# Patient Record
Sex: FEMALE | Race: BLACK OR AFRICAN AMERICAN | Employment: FULL TIME | URBAN - METROPOLITAN AREA
[De-identification: names, ages, dates, MRNs, and addresses within clinical notes are randomized per-mention and may not be internally consistent; named-entity substitution may affect disease eponyms.]

---

## 2018-08-31 ENCOUNTER — HOSPITAL ENCOUNTER (EMERGENCY)
Facility: HOSPITAL | Age: 63
Discharge: HOME | End: 2018-09-01
Attending: EMERGENCY MEDICINE
Payer: COMMERCIAL

## 2018-08-31 DIAGNOSIS — M79.672 FOOT PAIN, LEFT: Primary | ICD-10-CM

## 2018-08-31 PROCEDURE — 99283 EMERGENCY DEPT VISIT LOW MDM: CPT

## 2018-09-01 ENCOUNTER — APPOINTMENT (OUTPATIENT)
Dept: GENERAL RADIOLOGY | Facility: HOSPITAL | Age: 63
End: 2018-09-01
Attending: EMERGENCY MEDICINE
Payer: COMMERCIAL

## 2018-09-01 VITALS
HEIGHT: 66 IN | HEART RATE: 76 BPM | WEIGHT: 183 LBS | BODY MASS INDEX: 29.41 KG/M2 | SYSTOLIC BLOOD PRESSURE: 127 MMHG | DIASTOLIC BLOOD PRESSURE: 68 MMHG | OXYGEN SATURATION: 100 % | RESPIRATION RATE: 17 BRPM | TEMPERATURE: 98 F

## 2018-09-01 PROCEDURE — 73630 X-RAY EXAM OF FOOT: CPT | Performed by: EMERGENCY MEDICINE

## 2018-09-01 NOTE — ED PROVIDER NOTES
Patient Seen in: [facility] AND [facility] Emergency Department    History   Patient presents with:  Swelling Edema (cardiovascular, metabolic)    Stated Complaint: pain    HPI    61 yo female with left foot pain and swelling for the last week.  She cannot recall note and vitals reviewed. ED Course   Labs Reviewed - No data to display    ED Course as of Sep 01 0114  ------------------------------------------------------------      MDM           Xray read by vision. No fracture. Discharge home on nsaids.  I

## 2021-09-24 ENCOUNTER — HOSPITAL ENCOUNTER (OUTPATIENT)
Dept: BONE DENSITY | Facility: HOSPITAL | Age: 66
Discharge: HOME | End: 2021-09-24
Attending: FAMILY MEDICINE
Payer: COMMERCIAL

## 2021-09-24 DIAGNOSIS — Z13.820 ENCOUNTER FOR SCREENING FOR OSTEOPOROSIS: ICD-10-CM

## 2021-09-24 PROCEDURE — 77080 DXA BONE DENSITY AXIAL: CPT | Performed by: FAMILY MEDICINE

## 2021-09-25 ENCOUNTER — HOSPITAL ENCOUNTER (OUTPATIENT)
Dept: MAMMOGRAPHY | Facility: HOSPITAL | Age: 66
Discharge: HOME | End: 2021-09-25
Attending: FAMILY MEDICINE
Payer: COMMERCIAL

## 2021-09-25 DIAGNOSIS — Z12.31 ENCOUNTER FOR SCREENING MAMMOGRAM FOR BREAST CANCER: ICD-10-CM

## 2021-09-25 PROCEDURE — 77067 SCR MAMMO BI INCL CAD: CPT | Performed by: FAMILY MEDICINE

## 2021-09-25 PROCEDURE — 77063 BREAST TOMOSYNTHESIS BI: CPT | Performed by: FAMILY MEDICINE

## 2022-05-28 ENCOUNTER — HOSPITAL ENCOUNTER (EMERGENCY)
Facility: HOSPITAL | Age: 67
Discharge: HOME | End: 2022-05-29
Attending: EMERGENCY MEDICINE
Payer: COMMERCIAL

## 2022-05-28 DIAGNOSIS — S39.011A ABDOMINAL MUSCLE STRAIN, INITIAL ENCOUNTER: Primary | ICD-10-CM

## 2022-05-28 PROCEDURE — 99284 EMERGENCY DEPT VISIT MOD MDM: CPT

## 2022-05-29 ENCOUNTER — APPOINTMENT (OUTPATIENT)
Dept: CT IMAGING | Facility: HOSPITAL | Age: 67
End: 2022-05-29
Attending: EMERGENCY MEDICINE
Payer: COMMERCIAL

## 2022-05-29 VITALS
OXYGEN SATURATION: 98 % | DIASTOLIC BLOOD PRESSURE: 83 MMHG | TEMPERATURE: 98 F | SYSTOLIC BLOOD PRESSURE: 142 MMHG | HEIGHT: 66 IN | BODY MASS INDEX: 29.25 KG/M2 | WEIGHT: 182 LBS | HEART RATE: 70 BPM | RESPIRATION RATE: 16 BRPM

## 2022-05-29 PROCEDURE — 74176 CT ABD & PELVIS W/O CONTRAST: CPT | Performed by: EMERGENCY MEDICINE

## 2022-05-29 NOTE — ED INITIAL ASSESSMENT (HPI)
Right flank pain radiating into RLQ since Wednesday. Denies fevers, N/V/D, difficulty with urination.

## 2022-09-26 ENCOUNTER — HOSPITAL ENCOUNTER (OUTPATIENT)
Dept: MAMMOGRAPHY | Facility: HOSPITAL | Age: 67
Discharge: HOME | End: 2022-09-26
Attending: FAMILY MEDICINE

## 2022-09-26 DIAGNOSIS — Z12.31 ENCOUNTER FOR SCREENING MAMMOGRAM FOR MALIGNANT NEOPLASM OF BREAST: ICD-10-CM

## 2022-09-26 PROCEDURE — 77067 SCR MAMMO BI INCL CAD: CPT | Performed by: FAMILY MEDICINE

## 2022-09-26 PROCEDURE — 77063 BREAST TOMOSYNTHESIS BI: CPT | Performed by: FAMILY MEDICINE

## 2023-03-28 NOTE — ED QUICK NOTES
Pt a/ox4, respirations unlabored, speech full/clear, gait steady, no acute distress. All ED orders completed. Pt instructed to return to ED for any new/severe s/s or as directed by provider, and to see PMD/specialist ASAP or as directed by provider. Detail Level: Detailed Detail Level: Zone

## 2023-10-03 ENCOUNTER — HOSPITAL ENCOUNTER (OUTPATIENT)
Dept: MAMMOGRAPHY | Facility: HOSPITAL | Age: 68
Discharge: HOME | End: 2023-10-03
Attending: FAMILY MEDICINE
Payer: COMMERCIAL

## 2023-10-03 DIAGNOSIS — Z12.31 ENCOUNTER FOR SCREENING MAMMOGRAM FOR MALIGNANT NEOPLASM OF BREAST: ICD-10-CM

## 2023-10-03 PROCEDURE — 77063 BREAST TOMOSYNTHESIS BI: CPT | Performed by: FAMILY MEDICINE

## 2023-10-03 PROCEDURE — 77067 SCR MAMMO BI INCL CAD: CPT | Performed by: FAMILY MEDICINE

## 2023-12-28 ENCOUNTER — APPOINTMENT (OUTPATIENT)
Dept: GENERAL RADIOLOGY | Facility: HOSPITAL | Age: 68
End: 2023-12-28
Attending: PHYSICIAN ASSISTANT
Payer: COMMERCIAL

## 2023-12-28 ENCOUNTER — APPOINTMENT (OUTPATIENT)
Dept: CT IMAGING | Facility: HOSPITAL | Age: 68
End: 2023-12-28
Attending: EMERGENCY MEDICINE
Payer: COMMERCIAL

## 2023-12-28 ENCOUNTER — APPOINTMENT (OUTPATIENT)
Dept: GENERAL RADIOLOGY | Facility: HOSPITAL | Age: 68
End: 2023-12-28
Attending: INTERNAL MEDICINE
Payer: COMMERCIAL

## 2023-12-28 ENCOUNTER — HOSPITAL ENCOUNTER (INPATIENT)
Facility: HOSPITAL | Age: 68
LOS: 5 days | Discharge: HOME HEALTH | End: 2024-01-02
Attending: EMERGENCY MEDICINE
Payer: COMMERCIAL

## 2023-12-28 ENCOUNTER — APPOINTMENT (OUTPATIENT)
Dept: GENERAL RADIOLOGY | Facility: HOSPITAL | Age: 68
End: 2023-12-28
Attending: EMERGENCY MEDICINE
Payer: COMMERCIAL

## 2023-12-28 ENCOUNTER — HOSPITAL ENCOUNTER (INPATIENT)
Facility: HOSPITAL | Age: 68
LOS: 5 days | Discharge: HOME HEALTH | End: 2024-01-02
Attending: EMERGENCY MEDICINE | Admitting: INTERNAL MEDICINE
Payer: COMMERCIAL

## 2023-12-28 DIAGNOSIS — S06.0X1A CONCUSSION WITH LOSS OF CONSCIOUSNESS OF 30 MINUTES OR LESS, INITIAL ENCOUNTER: ICD-10-CM

## 2023-12-28 DIAGNOSIS — S60.811A ABRASION OF RIGHT WRIST, INITIAL ENCOUNTER: ICD-10-CM
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
## 2023-12-28 DIAGNOSIS — S00.03XA HEMATOMA OF SCALP, INITIAL ENCOUNTER: ICD-10-CM

## 2023-12-28 PROBLEM — S06.0XAA CONCUSSION: Status: ACTIVE | Noted: 2023-12-28

## 2023-12-28 LAB
ANION GAP SERPL CALC-SCNC: 5 MMOL/L (ref 0–18)
BASOPHILS # BLD AUTO: 0.02 X10(3) UL (ref 0–0.2)
BASOPHILS NFR BLD AUTO: 0.4 %
BUN BLD-MCNC: 21 MG/DL (ref 9–23)
BUN/CREAT SERPL: 23.3 (ref 10–20)
CALCIUM BLD-MCNC: 8.6 MG/DL (ref 8.7–10.4)
CHLORIDE SERPL-SCNC: 107 MMOL/L (ref 98–112)
CO2 SERPL-SCNC: 28 MMOL/L (ref 21–32)
CREAT BLD-MCNC: 0.9 MG/DL
DEPRECATED RDW RBC AUTO: 44.4 FL (ref 35.1–46.3)
EGFRCR SERPLBLD CKD-EPI 2021: 69 ML/MIN/1.73M2 (ref 60–?)
EOSINOPHIL # BLD AUTO: 0.18 X10(3) UL (ref 0–0.7)
EOSINOPHIL NFR BLD AUTO: 3.5 %
ERYTHROCYTE [DISTWIDTH] IN BLOOD BY AUTOMATED COUNT: 13.8 % (ref 11–15)
GLUCOSE BLD-MCNC: 141 MG/DL (ref 70–99)
GLUCOSE BLDC GLUCOMTR-MCNC: 128 MG/DL (ref 70–99)
HCT VFR BLD AUTO: 36.7 %
HGB BLD-MCNC: 11.3 G/DL
IMM GRANULOCYTES # BLD AUTO: 0.01 X10(3) UL (ref 0–1)
IMM GRANULOCYTES NFR BLD: 0.2 %
LYMPHOCYTES # BLD AUTO: 2.73 X10(3) UL (ref 1–4)
LYMPHOCYTES NFR BLD AUTO: 52.9 %
MCH RBC QN AUTO: 26.7 PG (ref 26–34)
MCHC RBC AUTO-ENTMCNC: 30.8 G/DL (ref 31–37)
MCV RBC AUTO: 86.8 FL
MONOCYTES # BLD AUTO: 0.35 X10(3) UL (ref 0.1–1)
MONOCYTES NFR BLD AUTO: 6.8 %
NEUTROPHILS # BLD AUTO: 1.87 X10 (3) UL (ref 1.5–7.7)
NEUTROPHILS # BLD AUTO: 1.87 X10(3) UL (ref 1.5–7.7)
NEUTROPHILS NFR BLD AUTO: 36.2 %
OSMOLALITY SERPL CALC.SUM OF ELEC: 295 MOSM/KG (ref 275–295)
PLATELET # BLD AUTO: 240 10(3)UL (ref 150–450)
POTASSIUM SERPL-SCNC: 4.5 MMOL/L (ref 3.5–5.1)
RBC # BLD AUTO: 4.23 X10(6)UL
SODIUM SERPL-SCNC: 140 MMOL/L (ref 136–145)
WBC # BLD AUTO: 5.2 X10(3) UL (ref 4–11)

## 2023-12-28 PROCEDURE — 99223 1ST HOSP IP/OBS HIGH 75: CPT | Performed by: INTERNAL MEDICINE

## 2023-12-28 PROCEDURE — 70450 CT HEAD/BRAIN W/O DYE: CPT | Performed by: EMERGENCY MEDICINE

## 2023-12-28 PROCEDURE — 72125 CT NECK SPINE W/O DYE: CPT | Performed by: EMERGENCY MEDICINE

## 2023-12-28 PROCEDURE — 73502 X-RAY EXAM HIP UNI 2-3 VIEWS: CPT | Performed by: PHYSICIAN ASSISTANT

## 2023-12-28 PROCEDURE — 73590 X-RAY EXAM OF LOWER LEG: CPT | Performed by: INTERNAL MEDICINE

## 2023-12-28 PROCEDURE — 72040 X-RAY EXAM NECK SPINE 2-3 VW: CPT | Performed by: INTERNAL MEDICINE

## 2023-12-28 PROCEDURE — 73110 X-RAY EXAM OF WRIST: CPT | Performed by: EMERGENCY MEDICINE

## 2023-12-28 PROCEDURE — 73501 X-RAY EXAM HIP UNI 1 VIEW: CPT | Performed by: PHYSICIAN ASSISTANT

## 2023-12-28 PROCEDURE — 99497 ADVNCD CARE PLAN 30 MIN: CPT | Performed by: INTERNAL MEDICINE

## 2023-12-28 RX ORDER — HYDROCODONE BITARTRATE AND ACETAMINOPHEN 5; 325 MG/1; MG/1
1 TABLET ORAL EVERY 4 HOURS PRN
Status: DISCONTINUED | OUTPATIENT
Start: 2023-12-28 | End: 2024-01-02

## 2023-12-28 RX ORDER — ONDANSETRON 2 MG/ML
4 INJECTION INTRAMUSCULAR; INTRAVENOUS ONCE
Status: COMPLETED | OUTPATIENT
Start: 2023-12-28 | End: 2023-12-28

## 2023-12-28 RX ORDER — MELATONIN
3 NIGHTLY PRN
Status: DISCONTINUED | OUTPATIENT
Start: 2023-12-28 | End: 2024-01-02

## 2023-12-28 RX ORDER — POLYETHYLENE GLYCOL 3350 17 G/17G
17 POWDER, FOR SOLUTION ORAL DAILY PRN
Status: DISCONTINUED | OUTPATIENT
Start: 2023-12-28 | End: 2024-01-02

## 2023-12-28 RX ORDER — ENEMA 19; 7 G/133ML; G/133ML
1 ENEMA RECTAL ONCE AS NEEDED
Status: DISCONTINUED | OUTPATIENT
Start: 2023-12-28 | End: 2024-01-02

## 2023-12-28 RX ORDER — METOCLOPRAMIDE HYDROCHLORIDE 5 MG/ML
5 INJECTION INTRAMUSCULAR; INTRAVENOUS EVERY 8 HOURS PRN
Status: DISCONTINUED | OUTPATIENT
Start: 2023-12-28 | End: 2024-01-02

## 2023-12-28 RX ORDER — MORPHINE SULFATE 2 MG/ML
1 INJECTION, SOLUTION INTRAMUSCULAR; INTRAVENOUS EVERY 2 HOUR PRN
Status: DISCONTINUED | OUTPATIENT
Start: 2023-12-28 | End: 2024-01-02

## 2023-12-28 RX ORDER — MORPHINE SULFATE 2 MG/ML
2 INJECTION, SOLUTION INTRAMUSCULAR; INTRAVENOUS EVERY 2 HOUR PRN
Status: DISCONTINUED | OUTPATIENT
Start: 2023-12-28 | End: 2024-01-02

## 2023-12-28 RX ORDER — BENZONATATE 200 MG/1
200 CAPSULE ORAL 3 TIMES DAILY PRN
Status: DISCONTINUED | OUTPATIENT
Start: 2023-12-28 | End: 2024-01-02

## 2023-12-28 RX ORDER — ACETAMINOPHEN 500 MG
500 TABLET ORAL EVERY 4 HOURS PRN
Status: DISCONTINUED | OUTPATIENT
Start: 2023-12-28 | End: 2024-01-02

## 2023-12-28 RX ORDER — HYDROCODONE BITARTRATE AND ACETAMINOPHEN 5; 325 MG/1; MG/1
2 TABLET ORAL EVERY 4 HOURS PRN
Status: DISCONTINUED | OUTPATIENT
Start: 2023-12-28 | End: 2024-01-02

## 2023-12-28 RX ORDER — MORPHINE SULFATE 4 MG/ML
4 INJECTION, SOLUTION INTRAMUSCULAR; INTRAVENOUS EVERY 2 HOUR PRN
Status: DISCONTINUED | OUTPATIENT
Start: 2023-12-28 | End: 2024-01-02

## 2023-12-28 RX ORDER — ONDANSETRON 2 MG/ML
4 INJECTION INTRAMUSCULAR; INTRAVENOUS EVERY 6 HOURS PRN
Status: DISCONTINUED | OUTPATIENT
Start: 2023-12-28 | End: 2024-01-02

## 2023-12-28 RX ORDER — SENNOSIDES 8.6 MG
17.2 TABLET ORAL NIGHTLY PRN
Status: DISCONTINUED | OUTPATIENT
Start: 2023-12-28 | End: 2024-01-02

## 2023-12-28 RX ORDER — ACETAMINOPHEN 325 MG/1
650 TABLET ORAL EVERY 4 HOURS PRN
Status: DISCONTINUED | OUTPATIENT
Start: 2023-12-28 | End: 2024-01-02

## 2023-12-28 RX ORDER — BISACODYL 10 MG
10 SUPPOSITORY, RECTAL RECTAL
Status: DISCONTINUED | OUTPATIENT
Start: 2023-12-28 | End: 2024-01-02

## 2023-12-28 NOTE — ED QUICK NOTES
Officer Richardson - [place] [employer]  [phone]  Would like an update if patient ends up getting admitted or has serious injury  Pt is otherwise free to go home if discharged

## 2023-12-28 NOTE — PROGRESS NOTES
OT orders generated from Functional Mobility Screen.Pt currently in xray. Ortho consult pending for possible distal radial fx. OT eval deferred pending xray results and completed ortho consult

## 2023-12-28 NOTE — ED PROVIDER NOTES
Patient Seen in: [facility] Emergency Department    History     Chief Complaint   Patient presents with    Fall       HPI    History is provided by patient/independent historian: Patient, patient's    68-year-old female with no significant past medical history here after a fall down 5 steps earlier today.  Patient supposedly got unbalanced after her grandchildren ran into her and she fell down 5 steps.  She did supposedly lose consciousness.  Patient herself has no complaints at this time.   reports that she is more soft-spoken than usual.    History reviewed. History reviewed. No pertinent past medical history.      History reviewed.   Past Surgical History:   Procedure Laterality Date    LETITIA LOCALIZATION WIRE 1 SITE LEFT (CPT=19281)      2019    NEEDLE BIOPSY RIGHT           Home Medications reviewed :  (Not in a hospital admission)        History reviewed.   Social History     Socioeconomic History    Marital status:    Tobacco Use    Smoking status: Never    Smokeless tobacco: Never         ROS  Review of Systems   Respiratory:  Negative for shortness of breath.    Cardiovascular:  Negative for chest pain.   Neurological:  Positive for syncope.   All other systems reviewed and are negative.     All other pertinent organ systems are reviewed and are negative.      Physical Exam     ED Triage Vitals [12/28/23 0735]   /80   Pulse 83   Resp 16   Temp 96.8 °F (36 °C)   Temp src    SpO2 98 %   O2 Device None (Room air)     Vital signs reviewed.      Physical Exam  Vitals and nursing note reviewed.   HENT:      Head:     Cardiovascular:      Pulses: Normal pulses.   Pulmonary:      Effort: No respiratory distress.   Abdominal:      General: There is no distension.   Musculoskeletal:        Arms:    Neurological:      Mental Status: She is alert.      Comments: Answering questions appropriately, no facial asymmetry, 5-5 strength in bilateral upper and lower extremities         ED  Course       Labs:     Labs Reviewed   POCT GLUCOSE - Abnormal; Notable for the following components:       Result Value    POC Glucose  128 (*)     All other components within normal limits   CBC W/ DIFFERENTIAL - Abnormal; Notable for the following components:    HGB 11.3 (*)     MCHC 30.8 (*)     All other components within normal limits   CBC WITH DIFFERENTIAL WITH PLATELET    Narrative:     The following orders were created for panel order CBC With Differential With Platelet.                  Procedure                               Abnormality         Status                                     ---------                               -----------         ------                                     CBC W/ DIFFERENTIAL[[id number]]          Abnormal            Final result                                                 Please view results for these tests on the individual orders.   BASIC METABOLIC PANEL (8)   RAINBOW DRAW LAVENDER   RAINBOW DRAW LIGHT GREEN   RAINBOW DRAW BLUE   RAINBOW DRAW GOLD         My EKG Interpretation:   As reviewed and Interpreted by me      Imaging Results Available and Reviewed while in ED:   XR WRIST COMPLETE (MIN 3 VIEWS), RIGHT (CPT=73110)    Result Date: 12/28/2023  CONCLUSION: Suspect distal radial impaction fracture .  No cortical disruption    Dictated by (CST): Cristhian Neal MD on 12/28/2023 at 9:15 AM     Finalized by (CST): Cristihan Neal MD on 12/28/2023 at 9:16 AM          CT SPINE CERVICAL (CPT=72125)    Result Date: 12/28/2023  CONCLUSION:  1. No acute fracture or posttraumatic malalignment cervical spine is detected.  2. Degenerative changes of the cervical spine, particularly at C5-C6.  3. Lesser incidental findings as above.    Dictated by (CST): Bryan Christianson MD on 12/28/2023 at 8:26 AM     Finalized by (CST): Bryan Christianson MD on 12/28/2023 at 8:29 AM          CT BRAIN OR HEAD ([id number])    Result Date: 12/28/2023  CONCLUSION:  1. Large right parietal subgaleal hematoma  without evidence of underlying calvarial fracture, coup/contrecoup intraparenchymal contusion, or intracranial hemorrhage.  2. Senescent changes of parenchymal volume loss with sequela of chronic microvascular ischemic disease. There is also large vessel atherosclerosis.   3. Extensive paranasal sinus disease, likely chronic.  4. Lesser incidental findings as above.    Dictated by (CST): Bryan Christianson MD on 12/28/2023 at 8:22 AM     Finalized by (CST): Bryan Christianson MD on 12/28/2023 at 8:25 AM         My review and independent interpretation of CT images: no ICH. Radiology report corroborates this in addition to other details as reported by them.      Decision rules/scores evaluated: none      Diagnostic labs/tests considered but not ordered: CBC, BMP, type and screen    ED Medications Administered:   Medications   ondansetron (Zofran) 4 MG/2ML injection 4 mg (4 mg Intravenous Given 12/28/23 0856)            - pt reevaluated - still drowsy, cannot recall earlier events    MDM       Medical Decision Making      Differential Diagnosis: After obtaining the patient's history, performing the physical exam and reviewing the diagnostics, multiple initial diagnoses were considered based on the presenting problem including fall, fracture, contusion, ICH    External document review: I personally reviewed available external medical records for any recent pertinent discharge summaries, testing, and procedures - the findings are as follows: 8/4/23 visit with Dr. Peterson for colonoscopy    Complicating Factors: The patient already  has no past medical history on file. to contribute to the complexity of this ED evaluation.    Procedures performed: none    Discussed management with physician/appropriate source: Dr. Bro    Considered admission/deescalation of care for: fall    Social determinants of health affecting patient care: none    Prescription medications considered: discussed continuing current medication  regimen    The patient requires continuous monitoring for: fall    Shared decision making: discussed possible admission      Disposition and Plan     Clinical Impression:  1. Fall, initial encounter    2. Abrasion of right wrist, initial encounter    3. Concussion with loss of consciousness of 30 minutes or less, initial encounter    4. Hematoma of scalp, initial encounter        Disposition:  Admit    Follow-up:  No follow-up provider specified.    Medications Prescribed:  There are no discharge medications for this patient.      Hospital Problems       Present on Admission             ICD-10-CM Noted POA    Concussion S06.0XAA 12/28/2023 Unknown

## 2023-12-28 NOTE — ED INITIAL ASSESSMENT (HPI)
Pt presents to ED via EMS for mechanical fall down approximately 4 stairs.  witnessed the fall, states grandchildren bumped into her and she fell backwards, hitting her head and losing consciousness. Upon arrival to ED pt is responding to Dr Wright by nodding her head \"yes\" and \"no\" but mostly nonverbal at this time. Scrape to right wrist visible, no deformity. Pt in c collar on arrival

## 2023-12-28 NOTE — PROGRESS NOTES
[facility]    Progress Note    Gita Bustamante Patient Status:  Inpatient    10/29/1955 MRN [id number]   Location [facility] 5SW/SE Attending Juan Tejeda MD   Hosp Day # 0 PCP SUKHWINDER THOMPSON MD     SUBJECTIVE:    Pt's  confirmed a fall down 4-5 stairs this AM.  Pt answering questions appropriately with eyes closed.  States pain to ulnar aspect right wrist/distal forearm.  Starting to have right hip pain.    OBJECTIVE:  Pt 67 yo female admitted through ER for head injury sustained the AM after a fall down 4-5 stairs. Ortho consulted for right wrist injury evaluation, x-rays read as suspicious for distal radial fracture.      Blood pressure 139/72, pulse 70, temperature 97.5 °F (36.4 °C), temperature source Axillary, resp. rate 16, height 5' 6\" (1.676 m), weight 164 lb 1.6 oz (74.4 kg), SpO2 100%.         Recent Results (from the past 24 hour(s))   POCT Glucose    Collection Time: 23  7:33 AM   Result Value Ref Range    POC Glucose  128 (H) 70 - 99 mg/dL   RAINBOW DRAW LAVENDER    Collection Time: 23  7:37 AM   Result Value Ref Range    Hold Lavender Auto Resulted    RAINBOW DRAW LIGHT GREEN    Collection Time: 23  7:37 AM   Result Value Ref Range    Hold Lt Green Auto Resulted    RAINBOW DRAW BLUE    Collection Time: 23  7:37 AM   Result Value Ref Range    Hold Blue Auto Resulted    RAINBOW DRAW GOLD    Collection Time: 23  7:37 AM   Result Value Ref Range    Hold Gold Auto Resulted    Basic Metabolic Panel (8)    Collection Time: 23  7:37 AM   Result Value Ref Range    Glucose 141 (H) 70 - 99 mg/dL    Sodium 140 136 - 145 mmol/L    Potassium 4.5 3.5 - 5.1 mmol/L    Chloride 107 98 - 112 mmol/L    CO2 28.0 21.0 - 32.0 mmol/L    Anion Gap 5 0 - 18 mmol/L    BUN 21 9 - 23 mg/dL    Creatinine 0.90 0.55 - 1.02 mg/dL    BUN/CREA Ratio 23.3 (H) 10.0 - 20.0    Calcium, Total 8.6 (L) 8.7 - 10.4 mg/dL    Calculated Osmolality 295 275 - 295 mOsm/kg     eGFR-Cr 69 >=60 mL/min/1.73m2   CBC W/ DIFFERENTIAL    Collection Time: 12/28/23  7:37 AM   Result Value Ref Range    WBC 5.2 4.0 - 11.0 x10(3) uL    RBC 4.23 3.80 - 5.30 x10(6)uL    HGB 11.3 (L) 12.0 - 16.0 g/dL    HCT 36.7 35.0 - 48.0 %    MCV 86.8 80.0 - 100.0 fL    MCH 26.7 26.0 - 34.0 pg    MCHC 30.8 (L) 31.0 - 37.0 g/dL    RDW-SD 44.4 35.1 - 46.3 fL    RDW 13.8 11.0 - 15.0 %    .0 150.0 - 450.0 10(3)uL    Neutrophil Absolute Prelim 1.87 1.50 - 7.70 x10 (3) uL    Neutrophil Absolute 1.87 1.50 - 7.70 x10(3) uL    Lymphocyte Absolute 2.73 1.00 - 4.00 x10(3) uL    Monocyte Absolute 0.35 0.10 - 1.00 x10(3) uL    Eosinophil Absolute 0.18 0.00 - 0.70 x10(3) uL    Basophil Absolute 0.02 0.00 - 0.20 x10(3) uL    Immature Granulocyte Absolute 0.01 0.00 - 1.00 x10(3) uL    Neutrophil % 36.2 %    Lymphocyte % 52.9 %    Monocyte % 6.8 %    Eosinophil % 3.5 %    Basophil % 0.4 %    Immature Granulocyte % 0.2 %        XR WRIST COMPLETE (MIN 3 VIEWS), RIGHT (CPT=73110)    Result Date: 12/28/2023  CONCLUSION: Suspect distal radial impaction fracture .  No cortical disruption    Dictated by (CST): Cristhian Neal MD on 12/28/2023 at 9:15 AM     Finalized by (CST): Cristhian Neal MD on 12/28/2023 at 9:16 AM          CT SPINE CERVICAL (CPT=72125)    Result Date: 12/28/2023  CONCLUSION:  1. No acute fracture or posttraumatic malalignment cervical spine is detected.  2. Degenerative changes of the cervical spine, particularly at C5-C6.  3. Lesser incidental findings as above.    Dictated by (CST): rByan Christianson MD on 12/28/2023 at 8:26 AM     Finalized by (CST): Bryan Christianson MD on 12/28/2023 at 8:29 AM          CT BRAIN OR HEAD ([id number])    Result Date: 12/28/2023  CONCLUSION:  1. Large right parietal subgaleal hematoma without evidence of underlying calvarial fracture, coup/contrecoup intraparenchymal contusion, or intracranial hemorrhage.  2. Senescent changes of parenchymal volume loss with sequela of chronic  microvascular ischemic disease. There is also large vessel atherosclerosis.   3. Extensive paranasal sinus disease, likely chronic.  4. Lesser incidental findings as above.    Dictated by (CST): Bryan Christianson MD on 12/28/2023 at 8:22 AM     Finalized by (CST): Bryan Christianson MD on 12/28/2023 at 8:25 AM              Ortho Exam:    Right distal forearm dorsal ulnar sided superficial abrasion with surrounding ecchymosis and mild swelling. Tender distal ulna, non-tender distal radius, hand and phalanges, humerus and shoulder.  Full ROM all fingers with mild swelling. Full elbow flexion/extension, sup/pronation. NVI.    Right LE no rotational deformity noted. Lifts heel off bed, flexes knee, +DF/PF. No groin pain with log roll. No pain to pressure over femur but + pain lateral greater trochanter. NVI.    X-rays right wrist reviewed by Dr. Garcia, no apparent acute fracture. Can not r/o occult fracture.     ASSESSMENT/PLAN:    Right wrist contusion, possible occult fracture  Right hip pain    Velcro splint to right wrist. No heavy lifting/pushing/pulling. May remove for hygiene purposes otherwise on at all times.  2.   Right hip pain, per RN,  tib/fib x-rays have been ordered by hospitalist. Will order right hip x-rays.  3.   Follow -up in office in 3 weeks for repeat x-rays right wrist and evaluation with Dr. Garcia. Call [phone] for appointment.      Virginia Novak PA-C/Dr. Garcia  12/28/2023  1:43 PM

## 2023-12-28 NOTE — ED QUICK NOTES
Pt was asked in privacy if she feels safe at home or if anyone is threatening her safety.  Pt denies feeling threatened or unsafe at home

## 2023-12-28 NOTE — ED QUICK NOTES
Orders for admission, patient is aware of plan and ready to go upstairs. Any questions, please call ED RN nehemiah at extension [phone].     Patient Covid vaccination status: Unvaccinated     COVID Test Ordered in ED: None    COVID Suspicion at Admission: N/A    Running Infusions:  None    Mental Status/LOC at time of transport: AAOx4 but slow to respond     Other pertinent information: pt has wound to back of scalp which has been irrigated and assessed by dr pratt  Pt is stable in ED, not ambulatory and NPO in ED d/t nausea  CIWA score: N/A   NIH score:  N/A

## 2023-12-29 LAB
ANION GAP SERPL CALC-SCNC: 2 MMOL/L (ref 0–18)
BASOPHILS # BLD AUTO: 0.01 X10(3) UL (ref 0–0.2)
BASOPHILS NFR BLD AUTO: 0.2 %
BUN BLD-MCNC: 12 MG/DL (ref 9–23)
BUN/CREAT SERPL: 16 (ref 10–20)
CALCIUM BLD-MCNC: 8.8 MG/DL (ref 8.7–10.4)
CHLORIDE SERPL-SCNC: 104 MMOL/L (ref 98–112)
CO2 SERPL-SCNC: 29 MMOL/L (ref 21–32)
CREAT BLD-MCNC: 0.75 MG/DL
DEPRECATED RDW RBC AUTO: 42.8 FL (ref 35.1–46.3)
EGFRCR SERPLBLD CKD-EPI 2021: 86 ML/MIN/1.73M2 (ref 60–?)
EOSINOPHIL # BLD AUTO: 0.03 X10(3) UL (ref 0–0.7)
EOSINOPHIL NFR BLD AUTO: 0.6 %
ERYTHROCYTE [DISTWIDTH] IN BLOOD BY AUTOMATED COUNT: 13.7 % (ref 11–15)
GLUCOSE BLD-MCNC: 111 MG/DL (ref 70–99)
HCT VFR BLD AUTO: 34.8 %
HGB BLD-MCNC: 10.9 G/DL
IMM GRANULOCYTES # BLD AUTO: 0.01 X10(3) UL (ref 0–1)
IMM GRANULOCYTES NFR BLD: 0.2 %
LYMPHOCYTES # BLD AUTO: 0.95 X10(3) UL (ref 1–4)
LYMPHOCYTES NFR BLD AUTO: 18.5 %
MAGNESIUM SERPL-MCNC: 1.8 MG/DL (ref 1.6–2.6)
MCH RBC QN AUTO: 26.7 PG (ref 26–34)
MCHC RBC AUTO-ENTMCNC: 31.3 G/DL (ref 31–37)
MCV RBC AUTO: 85.1 FL
MONOCYTES # BLD AUTO: 0.32 X10(3) UL (ref 0.1–1)
MONOCYTES NFR BLD AUTO: 6.2 %
NEUTROPHILS # BLD AUTO: 3.82 X10 (3) UL (ref 1.5–7.7)
NEUTROPHILS # BLD AUTO: 3.82 X10(3) UL (ref 1.5–7.7)
NEUTROPHILS NFR BLD AUTO: 74.3 %
OSMOLALITY SERPL CALC.SUM OF ELEC: 280 MOSM/KG (ref 275–295)
PLATELET # BLD AUTO: 233 10(3)UL (ref 150–450)
POTASSIUM SERPL-SCNC: 3.6 MMOL/L (ref 3.5–5.1)
RBC # BLD AUTO: 4.09 X10(6)UL
SODIUM SERPL-SCNC: 135 MMOL/L (ref 136–145)
WBC # BLD AUTO: 5.1 X10(3) UL (ref 4–11)

## 2023-12-29 PROCEDURE — 99233 SBSQ HOSP IP/OBS HIGH 50: CPT | Performed by: INTERNAL MEDICINE

## 2023-12-29 RX ORDER — SODIUM CHLORIDE 9 MG/ML
INJECTION, SOLUTION INTRAVENOUS CONTINUOUS
Status: DISCONTINUED | OUTPATIENT
Start: 2023-12-29 | End: 2024-01-02

## 2023-12-29 RX ORDER — MAGNESIUM OXIDE 400 MG/1
400 TABLET ORAL ONCE
Status: COMPLETED | OUTPATIENT
Start: 2023-12-29 | End: 2023-12-29

## 2023-12-29 NOTE — PHYSICAL THERAPY NOTE
PHYSICAL THERAPY EVALUATION - INPATIENT     Room Number: 553/553-A  Evaluation Date: 12/29/2023  Type of Evaluation: Initial   Physician Order: PT Eval and Treat    Presenting Problem: fall down stairs, R distal radial fx (NWB; wrist brace; sling ordered; no surgical intervention); R parietal subgaleal hematoma (discussed with Dr. Lambert; OK to proceed with PT evaluation)     Reason for Therapy: Mobility Dysfunction and Discharge Planning    PHYSICAL THERAPY ASSESSMENT     Patient is a 68 year old female admitted 12/28/2023 for fall down stairs, R distal radial fx (NWB; wrist brace; sling ordered; no surgical intervention); R parietal subgaleal hematoma (discussed with Dr. Lambert; OK to proceed with PT evaluation). Patient's current functional deficits include impaired bed mobility, transfers, gait, stair negotiation, and tolerance for activity, which are below the patient's pre-admission status. Patient in bed with family member present upon arrival. RN approved activity. Educated patient on POC and benefits of mobilization. Agreeable to participate. Patient reporting neck pain, rated 9 out of 10 per the pain scale. Patient's head/neck resting in L lateral rotated position. With cues, patient able to to laterally rotate to the R to midline, however, unable to rotate right past midline. Educated patient on RLE NWB status, good adherence demonstrated throughout.     Bed Mobility: Min A supine>EOB. Patient tolerated sitting EOB approx 10 minutes, requiring BUE support and CGA in order to maintain static sitting balance. Patient reporting dizziness while sitting EOB, /71 mmHg. Patient reporting double vision and nausea while sitting EOB (MD and RN made aware). Further activity deferred at this time. Mod A EOB>supine.     Patient in bed at end of session. Needs in reach and alarm activated. Family member present. RN aware.      The patient's Approx Degree of Impairment: 57.7% has been calculated based on  documentation in the [facility] '6 clicks' Inpatient Basic Mobility Short Form.  Research supports that patients with this level of impairment may benefit from Rehab, however, recommendation currently Undetermined at this time due to limited evaluation.    Patient will benefit from continued IP PT services to address these deficits in preparation for discharge.    DISCHARGE RECOMMENDATIONS  PT Discharge Recommendations: Undetermined (HHPT pending progress)    PLAN  PT Treatment Plan: Bed mobility;Body mechanics;Coordination;Endurance;Energy conservation;Patient education;Gait training;Stair training;Transfer training;Balance training  Rehab Potential : Good  Frequency (Obs): 5x/week       PHYSICAL THERAPY MEDICAL/SOCIAL HISTORY     Problem List  Principal Problem:    Fall, initial encounter  Active Problems:    Concussion    Abrasion of right wrist, initial encounter    Concussion with loss of consciousness of 30 minutes or less, initial encounter    Hematoma of scalp, initial encounter      HOME SITUATION  Home Situation  Type of Home: House  Home Layout: Two level  Lives With: Spouse  Patient Owned Equipment: None     Prior Level of [place]: Independent with ADLs/iADLs/functional mobility    SUBJECTIVE  \"I'm dizzy\"    PHYSICAL THERAPY EXAMINATION     OBJECTIVE  Precautions: Limb alert - right;Bed/chair alarm  Fall Risk: High fall risk    WEIGHT BEARING RESTRICTION  Weight Bearing Restriction: R upper extremity  R Upper Extremity: Non-Weight Bearing    PAIN ASSESSMENT  Ratin  Location: neck  Management Techniques:  (ice)    COGNITION  Following Commands:  follows one step commands with repetition and follows one step commands consistently    RANGE OF MOTION AND STRENGTH ASSESSMENT  Upper extremity ROM and strength are within functional limits; R wrist and fingers limited due to splint   Lower extremity ROM is within functional limits   Lower extremity strength is within functional limits; formal testing  deferred due to increased dizziness and nausea with activity     BALANCE  Static Sitting: Fair +  Dynamic Sitting: Fair  Static Standing: Not tested  Dynamic Standing: Not tested    ACTIVITY TOLERANCE  Pulse: 61  Heart Rate Source: Monitor     BP: 113/62 (131/71 mmHg while sitting EOB)  BP Location: Left arm  BP Method: Automatic  Patient Position: Lying    O2 WALK  Oxygen Therapy  SPO2% on Room Air at Rest: 100    AM-PAC '6-Clicks' INPATIENT SHORT FORM - BASIC MOBILITY  How much difficulty does the patient currently have...  Patient Difficulty: Turning over in bed (including adjusting bedclothes, sheets and blankets)?: A Little   Patient Difficulty: Sitting down on and standing up from a chair with arms (e.g., wheelchair, bedside commode, etc.): A Little   Patient Difficulty: Moving from lying on back to sitting on the side of the bed?: A Little   How much help from another person does the patient currently need...   Help from Another: Moving to and from a bed to a chair (including a wheelchair)?: A Lot   Help from Another: Need to walk in hospital room?: A Lot   Help from Another: Climbing 3-5 steps with a railing?: A Lot     AM-PAC Score:  Raw Score: 15   Approx Degree of Impairment: 57.7%   Standardized Score (AM-PAC Scale): 39.45   CMS Modifier (G-Code): CK    FUNCTIONAL ABILITY STATUS  Functional Mobility/Gait Assessment  Gait Assistance: Not tested    Exercise/Education Provided:  Bed mobility  Body mechanics  Posture    Patient End of Session: In bed;Needs met;Call light within reach;RN aware of session/findings;All patient questions and concerns addressed;Alarm set;Family present    CURRENT GOALS    Goals to be met by: 1/12/23  Patient Goal Patient's self-stated goal is: none stated   Goal #1 Patient is able to demonstrate supine - sit EOB @ level: supervision     Goal #1   Current Status    Goal #2 Patient is able to demonstrate transfers Sit to/from Stand at assistance level: CGA with  LRAD     Goal  #2  Current Status    Goal #3 Patient is able to ambulate 50 feet with assist device:  LRAD  at assistance level: CGA   Goal #3   Current Status    Goal #4 Stair goal TBD pending progress   Goal #4   Current Status    Goal #5 Patient to demonstrate independence with home activity/exercise instructions provided to patient in preparation for discharge.   Goal #5   Current Status      Patient Evaluation Complexity Level:  History Moderate - 1 or 2 personal factors and/or co-morbidities   Examination of body systems Moderate - addressing a total of 3 or more elements   Clinical Presentation Moderate - Evolving   Clinical Decision Making Moderate Complexity       Therapeutic Activity: 20 minutes

## 2023-12-29 NOTE — PLAN OF CARE
Xanaflex added with some relief for patient.  Problem: Patient Centered Care  Goal: Patient preferences are identified and integrated in the patient's plan of care  Description: Interventions:  - What would you like us to know as we care for you? From home with   - Provide timely, complete, and accurate information to patient/family  - Incorporate patient and family knowledge, values, beliefs, and cultural backgrounds into the planning and delivery of care  - Encourage patient/family to participate in care and decision-making at the level they choose  - Honor patient and family perspectives and choices  Outcome: Progressing     Problem: Patient/Family Goals  Goal: Patient/Family Long Term Goal  Description: Patient's Long Term Goal: to discharge home    Interventions:  - imaging, pain management, therapy recommendations  - See additional Care Plan goals for specific interventions  Outcome: Progressing  Goal: Patient/Family Short Term Goal  Description: Patient's Short Term Goal: pain management    Interventions:   - pain meds  - See additional Care Plan goals for specific interventions  Outcome: Progressing     Problem: PAIN - ADULT  Goal: Verbalizes/displays adequate comfort level or patient's stated pain goal  Description: INTERVENTIONS:  - Encourage pt to monitor pain and request assistance  - Assess pain using appropriate pain scale  - Administer analgesics based on type and severity of pain and evaluate response  - Implement non-pharmacological measures as appropriate and evaluate response  - Consider cultural and social influences on pain and pain management  - Manage/alleviate anxiety  - Utilize distraction and/or relaxation techniques  - Monitor for opioid side effects  - Notify MD/LIP if interventions unsuccessful or patient reports new pain  - Anticipate increased pain with activity and pre-medicate as appropriate  Outcome: Progressing     Problem: SAFETY ADULT - FALL  Goal: Free from fall  injury  Description: INTERVENTIONS:  - Assess pt frequently for physical needs  - Identify cognitive and physical deficits and behaviors that affect risk of falls.  - [place] fall precautions as indicated by assessment.  - Educate pt/family on patient safety including physical limitations  - Instruct pt to call for assistance with activity based on assessment  - Modify environment to reduce risk of injury  - Provide assistive devices as appropriate  - Consider OT/PT consult to assist with strengthening/mobility  - Encourage toileting schedule  Outcome: Progressing     Problem: DISCHARGE PLANNING  Goal: Discharge to home or other facility with appropriate resources  Description: INTERVENTIONS:  - Identify barriers to discharge w/pt and caregiver  - Include patient/family/discharge partner in discharge planning  - Arrange for needed discharge resources and transportation as appropriate  - Identify discharge learning needs (meds, wound care, etc)  - Arrange for interpreters to assist at discharge as needed  - Consider post-discharge preferences of patient/family/discharge partner  - Complete POLST form as appropriate  - Assess patient's ability to be responsible for managing their own health  - Refer to Case Management Department for coordinating discharge planning if the patient needs post-hospital services based on physician/LIP order or complex needs related to functional status, cognitive ability or social support system  Outcome: Progressing     Problem: SKIN/TISSUE INTEGRITY - ADULT  Goal: Skin integrity remains intact  Description: INTERVENTIONS  - Assess and document risk factors for pressure ulcer development  - Assess and document skin integrity  - Monitor for areas of redness and/or skin breakdown  - Initiate interventions, skin care algorithm/standards of care as needed  Outcome: Progressing     Problem: MUSCULOSKELETAL - ADULT  Goal: Return mobility to safest level of function  Description:  INTERVENTIONS:  - Assess patient stability and activity tolerance for standing, transferring and ambulating w/ or w/o assistive devices  - Assist with transfers and ambulation using safe patient handling equipment as needed  - Ensure adequate protection for wounds/incisions during mobilization  - Obtain PT/OT consults as needed  - Advance activity as appropriate  - Communicate ordered activity level and limitations with patient/family  Outcome: Not Progressing  Goal: Maintain proper alignment of affected body part  Description: INTERVENTIONS:  - Support and protect limb and body alignment per provider's orders  - Instruct and reinforce with patient and family use of appropriate assistive device and precautions (e.g. spinal or hip dislocation precautions)  Outcome: Not Progressing     Problem: Impaired Functional Mobility  Goal: Achieve highest/safest level of mobility/gait  Description: Interventions:  - Assess patient's functional ability and stability  - Promote increasing activity/tolerance for mobility and gait  - Educate and engage patient/family in tolerated activity level and precautions  - Recommend use of  RW for transfers and ambulation  Outcome: Not Progressing     Problem: Impaired Activities of Daily Living  Goal: Achieve highest/safest level of independence in self care  Description: Interventions:  - Assess ability and encourage patient to participate in ADLs to maximize function  - Promote sitting position while performing ADLs such as feeding, grooming, and bathing  - Educate and encourage patient/family in tolerated functional activity level and precautions during self-care  - Encourage patient to incorporate impaired side during daily activities to promote function  Outcome: Not Progressing

## 2023-12-29 NOTE — OCCUPATIONAL THERAPY NOTE
OCCUPATIONAL THERAPY EVALUATION - INPATIENT     Room Number: 553/553-A  Evaluation Date: 12/29/2023  Type of Evaluation: Initial       Physician Order: IP Consult to Occupational Therapy  Reason for Therapy: ADL/IADL Dysfunction and Discharge Planning    OCCUPATIONAL THERAPY ASSESSMENT     Patient is a 68 year old female admitted 12/28/2023 for fall down stairs; R distal radial fx (NWB; wrist brace; sling ordered; no surgical intervention); parietal subgaleal hematoma (discussed with Dr. Lambert; OK to proceed with evaluation).       RN cleared pt for participation in occupational therapy session, which was completed in collaboration with PT. Upon arrival, pt was supine in bed and agreeable to activity. Family member present during session. Introduced self and role of OT to pt. Pt verbalized understanding. Gait belt used.    Education provided  Educated pt about NWB status and benefits of mobility. Pt verbalized/demonstrated good carryover.    Analysis of occupational performance  Pt required up to total A for ADLs overall along with min A for supine to sit and CGA for sitting at EOB. Pt with L lateral rotation; unable to rotate neck past midline to R side. Able to follow NWB during bed mobility. /62 supine and /71 sitting at EOB. Pt with rated 9/10 dizziness sitting at EOB; also reports double vision and nausea. Activity deferred. Mod A for sit to supine. Pt was left in bed and alarm was activated. Call light and all needs left in reach. Handoff given to RN and MD.    BUE AROM WFL; R finger AROM WFL and grasp minimally    Discharge recommendation  The patient is below baseline and would benefit from continued skilled inpatient OT to address the above deficits, maximizing patient's ability to return to prior level of function. Recommend home pending progress at this time.    The patient's Approx Degree of Impairment: 63.03% has been calculated based on documentation in the [facility] '6 clicks' Inpatient Daily  Activity Short Form.  Research supports that patients with this level of impairment may benefit from acute rehab.      PLAN OT Treatment Plan: Balance activities;Energy conservation/work simplification techniques;Continued evaluation;Compensatory technique education;Fine motor coordination activities;Neuromuscluar reeducation;Equipment eval/education;Patient/Family training;Patient/Family education;Cognitive reorientation;Endurance training;UE strengthening/ROM;Functional transfer training;Visual perceptual training;IADL training;ADL training    OCCUPATIONAL THERAPY MEDICAL/SOCIAL HISTORY     Problem List  Principal Problem:    Fall, initial encounter  Active Problems:    Concussion    Abrasion of right wrist, initial encounter    Concussion with loss of consciousness of 30 minutes or less, initial encounter    Hematoma of scalp, initial encounter      Past Medical History  History reviewed. No pertinent past medical history.    Past Surgical History  Past Surgical History:   Procedure Laterality Date    LETITIA LOCALIZATION WIRE 1 SITE LEFT (CPT=19281)      2019    NEEDLE BIOPSY RIGHT         HOME SITUATION  Type of Home: House  Home Layout: Two level  Lives With: Spouse                              Use of Assistive Device(s): none    Prior Level of [place]: independent    SUBJECTIVE  \"I am so dizzy.\"    OCCUPATIONAL THERAPY EXAMINATION      OBJECTIVE     Fall Risk: High fall risk    PAIN ASSESSMENT  Ratin  Location: neck  Management Techniques: Ice                                       ACTIVITIES OF DAILY LIVING ASSESSMENT  AM-PAC ‘6-Clicks’ Inpatient Daily Activity Short Form  How much help from another person does the patient currently need…  -   Putting on and taking off regular lower body clothing?: Total  -   Bathing (including washing, rinsing, drying)?: A Lot  -   Toileting, which includes using toilet, bedpan or urinal? : Total  -   Putting on and taking off regular upper body clothing?: A Little  -    Taking care of personal grooming such as brushing teeth?: A Little  -   Eating meals?: A Little    AM-PAC Score:  Score: 13  Approx Degree of Impairment: 63.03%  Standardized Score (AM-PAC Scale): 32.03  CMS Modifier (G-Code): CL    FUNCTIONAL TRANSFER ASSESSMENT  Supine to Sit : Minimal Assist       FUNCTIONAL ADL ASSESSMENT  Up to total A    OT Goals  Patients self stated goal is: to have less pain     Patient will complete functional transfer with CGA  Comment:     Patient will complete toileting with min A  Comment:     Patient will tolerate standing for 1 minutes in prep for adls with CGA   Comment:    Patient will complete item retrieval with CGA  Comment:          Goals  on:   Frequency: 3-5x/wk    Patient Evaluation Complexity Level:   Occupational Profile/Medical History MODERATE - Expanded review of history including review of medical or therapy record   Specific performance deficits impacting engagement in ADL/IADL MODERATE  3 - 5 performance deficits   Client Assessment/Performance Deficits MODERATE - Comorbidities and min to mod modifications of tasks    Clinical Decision Making MODERATE - Analysis of occupational profile, detailed assessments, several treatment options    Overall Complexity MODERATE     OT Session Time: 30 minutes  TA: 20 minutes       Tarah Galeano OT  [facility]  Inpatient Rehabilitation  Occupational Therapy  [phone]

## 2023-12-29 NOTE — PLAN OF CARE
Ortho on consult, wrist splint ordered  PT/OT evaluation  Neck,  right wrist, and right hip pain.   at bedside.    Problem: Patient Centered Care  Goal: Patient preferences are identified and integrated in the patient's plan of care  Description: Interventions:  - What would you like us to know as we care for you? From home with   - Provide timely, complete, and accurate information to patient/family  - Incorporate patient and family knowledge, values, beliefs, and cultural backgrounds into the planning and delivery of care  - Encourage patient/family to participate in care and decision-making at the level they choose  - Honor patient and family perspectives and choices  Outcome: Progressing     Problem: SKIN/TISSUE INTEGRITY - ADULT  Goal: Skin integrity remains intact  Description: INTERVENTIONS  - Assess and document risk factors for pressure ulcer development  - Assess and document skin integrity  - Monitor for areas of redness and/or skin breakdown  - Initiate interventions, skin care algorithm/standards of care as needed  Outcome: Progressing     Problem: Patient/Family Goals  Goal: Patient/Family Long Term Goal  Description: Patient's Long Term Goal: to discharge home    Interventions:  - imaging, pain management, therapy recommendations  - See additional Care Plan goals for specific interventions  Outcome: Not Progressing  Goal: Patient/Family Short Term Goal  Description: Patient's Short Term Goal: pain management    Interventions:   - pain meds  - See additional Care Plan goals for specific interventions  Outcome: Not Progressing     Problem: MUSCULOSKELETAL - ADULT  Goal: Return mobility to safest level of function  Description: INTERVENTIONS:  - Assess patient stability and activity tolerance for standing, transferring and ambulating w/ or w/o assistive devices  - Assist with transfers and ambulation using safe patient handling equipment as needed  - Ensure adequate protection for  wounds/incisions during mobilization  - Obtain PT/OT consults as needed  - Advance activity as appropriate  - Communicate ordered activity level and limitations with patient/family  Outcome: Not Progressing  Goal: Maintain proper alignment of affected body part  Description: INTERVENTIONS:  - Support and protect limb and body alignment per provider's orders  - Instruct and reinforce with patient and family use of appropriate assistive device and precautions (e.g. spinal or hip dislocation precautions)  Outcome: Not Progressing     Problem: Impaired Functional Mobility  Goal: Achieve highest/safest level of mobility/gait  Description: Interventions:  - Assess patient's functional ability and stability  - Promote increasing activity/tolerance for mobility and gait  - Educate and engage patient/family in tolerated activity level and precautions  - Recommend use of  RW for transfers and ambulation  Outcome: Not Progressing     Problem: Impaired Activities of Daily Living  Goal: Achieve highest/safest level of independence in self care  Description: Interventions:  - Assess ability and encourage patient to participate in ADLs to maximize function  - Promote sitting position while performing ADLs such as feeding, grooming, and bathing  - Educate and encourage patient/family in tolerated functional activity level and precautions during self-care  - Provide support under elbow of weak side to prevent shoulder subluxation  Outcome: Not Progressing

## 2023-12-29 NOTE — PLAN OF CARE
Patient is quiet and observed resting with eyes closed for most  of the shift. She does respond when her name is called and she is answering questions appropriately. Her  has been at the bedside with her. Wrist splint applied to right wrist as ordered. Patient reports the most severe pain is going down the side of her right neck to the top of her shoulder. Norco given as ordered as well as heat packs. Fall precautions maintained. Upon talking to the patient's , he stated that the patient fell down approximately 15 stairs inside the home.  Problem: Patient Centered Care  Goal: Patient preferences are identified and integrated in the patient's plan of care  Description: Interventions:  - What would you like us to know as we care for you? From home with   - Provide timely, complete, and accurate information to patient/family  - Incorporate patient and family knowledge, values, beliefs, and cultural backgrounds into the planning and delivery of care  - Encourage patient/family to participate in care and decision-making at the level they choose  - Honor patient and family perspectives and choices  Outcome: Progressing     Problem: Patient/Family Goals  Goal: Patient/Family Long Term Goal  Description: Patient's Long Term Goal: to discharge home    Interventions:  - imaging, pain management, therapy recommendations  - See additional Care Plan goals for specific interventions  Outcome: Progressing  Goal: Patient/Family Short Term Goal  Description: Patient's Short Term Goal: pain management    Interventions:   - pain meds  - See additional Care Plan goals for specific interventions  Outcome: Progressing     Problem: PAIN - ADULT  Goal: Verbalizes/displays adequate comfort level or patient's stated pain goal  Description: INTERVENTIONS:  - Encourage pt to monitor pain and request assistance  - Assess pain using appropriate pain scale  - Administer analgesics based on type and severity of pain and  evaluate response  - Implement non-pharmacological measures as appropriate and evaluate response  - Consider cultural and social influences on pain and pain management  - Manage/alleviate anxiety  - Utilize distraction and/or relaxation techniques  - Monitor for opioid side effects  - Notify MD/LIP if interventions unsuccessful or patient reports new pain  - Anticipate increased pain with activity and pre-medicate as appropriate  Outcome: Progressing     Problem: SAFETY ADULT - FALL  Goal: Free from fall injury  Description: INTERVENTIONS:  - Assess pt frequently for physical needs  - Identify cognitive and physical deficits and behaviors that affect risk of falls.  - [place] fall precautions as indicated by assessment.  - Educate pt/family on patient safety including physical limitations  - Instruct pt to call for assistance with activity based on assessment  - Modify environment to reduce risk of injury  - Provide assistive devices as appropriate  - Consider OT/PT consult to assist with strengthening/mobility  - Encourage toileting schedule  Outcome: Progressing     Problem: DISCHARGE PLANNING  Goal: Discharge to home or other facility with appropriate resources  Description: INTERVENTIONS:  - Identify barriers to discharge w/pt and caregiver  - Include patient/family/discharge partner in discharge planning  - Arrange for needed discharge resources and transportation as appropriate  - Identify discharge learning needs (meds, wound care, etc)  - Arrange for interpreters to assist at discharge as needed  - Consider post-discharge preferences of patient/family/discharge partner  - Complete POLST form as appropriate  - Assess patient's ability to be responsible for managing their own health  - Refer to Case Management Department for coordinating discharge planning if the patient needs post-hospital services based on physician/LIP order or complex needs related to functional status, cognitive ability or social  support system  Outcome: Progressing     Problem: SKIN/TISSUE INTEGRITY - ADULT  Goal: Skin integrity remains intact  Description: INTERVENTIONS  - Assess and document risk factors for pressure ulcer development  - Assess and document skin integrity  - Monitor for areas of redness and/or skin breakdown  - Initiate interventions, skin care algorithm/standards of care as needed  Outcome: Progressing     Problem: MUSCULOSKELETAL - ADULT  Goal: Return mobility to safest level of function  Description: INTERVENTIONS:  - Assess patient stability and activity tolerance for standing, transferring and ambulating w/ or w/o assistive devices  - Assist with transfers and ambulation using safe patient handling equipment as needed  - Ensure adequate protection for wounds/incisions during mobilization  - Obtain PT/OT consults as needed  - Advance activity as appropriate  - Communicate ordered activity level and limitations with patient/family  Outcome: Progressing  Goal: Maintain proper alignment of affected body part  Description: INTERVENTIONS:  - Support and protect limb and body alignment per provider's orders  - Instruct and reinforce with patient and family use of appropriate assistive device and precautions (e.g. spinal or hip dislocation precautions)  Outcome: Progressing     Problem: Impaired Functional Mobility  Goal: Achieve highest/safest level of mobility/gait  Description: Interventions:  - Assess patient's functional ability and stability  - Promote increasing activity/tolerance for mobility and gait  - Educate and engage patient/family in tolerated activity level and precautions  - Recommend use of  RW for transfers and ambulation  Outcome: Progressing     Problem: Impaired Activities of Daily Living  Goal: Achieve highest/safest level of independence in self care  Description: Interventions:  - Assess ability and encourage patient to participate in ADLs to maximize function  - Promote sitting position while  performing ADLs such as feeding, grooming, and bathing  - Educate and encourage patient/family in tolerated functional activity level and precautions during self-care  Outcome: Progressing

## 2023-12-29 NOTE — PROGRESS NOTES
[facility]     Hospitalist Progress Note     Gita Bustamante Patient Status:  Inpatient    10/29/1955 MRN [id number]   Location [facility] 5SW/SE Attending Vivian Lambert MD   Hosp Day # 1 PCP SUKHWINDER THOMPSON MD     Subjective:     Patient reclining in bed comfortably. Reports Neck stiffness this morning.   No chest pain, sob, n/v/d, change in vision.       Objective:    Review of Systems:   ROS completed; pertinent positive and negatives stated in subjective.      Vital signs:  Temp:  [97.4 °F (36.3 °C)-98.3 °F (36.8 °C)] 97.4 °F (36.3 °C)  Pulse:  [63-71] 64  Resp:  [16-19] 16  BP: (119-139)/(64-72) 133/67  SpO2:  [96 %-100 %] 96 %      Physical Exam:    Gen: NAD AO x3  Chest: good air entry CTABL  CVS: normal s1 and s2 RR  Abd: NABS soft NT ND  Neuro: CN 2-12 grossly intact  Ext: no edema in bilateral LE      Diagnostic Data:    Labs:  Recent Labs   Lab 23  0737 23  0548   WBC 5.2 5.1   HGB 11.3* 10.9*   MCV 86.8 85.1   .0 233.0       Recent Labs   Lab 23  0737 23  0548   * 111*   BUN 21 12   CREATSERUM 0.90 0.75   CA 8.6* 8.8    135*   K 4.5 3.6    104   CO2 28.0 29.0       Estimated Creatinine Clearance: 66.3 mL/min (based on SCr of 0.75 mg/dL).    No results for input(s): \"PTP\", \"INR\" in the last 168 hours.           Imaging: Imaging data reviewed in Epic.    Medications:     Assessment & Plan:     Parietal subgaleal hematoma 2/2 fall  CT reviewed  Continue neurochecks  Supportive management  PRN zofran  Zanaflex PRN  PT/OT  Orthostatic hypotension  Dizziness  Started on IVF  Monitor vitals  Daily orthostatic vitals  R Hip pain  Imaging reviewed, no fracture  PT/OT  Close follow up  R wrist contusion with possible distal radial impact fracture  Normal range of motion  Ortho on consult - non-surgical management  Outpatient follow up  [facility]  Full code  ~35 minutes spend    Plan of care discussed with patient or family at  bedside.      Supplementary Documentation:     Quality:  DVT Prophylaxis: SCDs  CODE status: Full  [place]: No  Central line: No      Estimated date of discharge: TBD  Discharge is dependent on: clinical stability  At this point Ms. Bustamante is expected to be discharge to: home      Vivian Lambert MD  Hospitalist    MDM: High, I personally reviewed the available laboratories, imaging including XR. I discussed the case with RN. I ordered laboratories, studies including AM labs.   Medical decision making high, risk is high.       The 21st Century Cures Act makes medical notes like these available to patients in the interest of transparency. Please be advised this is a medical document. Medical documents are intended to carry relevant information, facts as evident, and the clinical opinion of the practitioner. The medical note is intended as peer to peer communication and may appear blunt or direct. It is written in medical language and may contain abbreviations or verbiage that are unfamiliar.

## 2023-12-30 LAB
ALBUMIN SERPL-MCNC: 3.4 G/DL (ref 3.2–4.8)
ALBUMIN/GLOB SERPL: 1.1 {RATIO} (ref 1–2)
ALP LIVER SERPL-CCNC: 68 U/L
ALT SERPL-CCNC: 10 U/L
ANION GAP SERPL CALC-SCNC: 5 MMOL/L (ref 0–18)
AST SERPL-CCNC: 16 U/L (ref ?–34)
BASOPHILS # BLD AUTO: 0.01 X10(3) UL (ref 0–0.2)
BASOPHILS NFR BLD AUTO: 0.2 %
BILIRUB SERPL-MCNC: 0.4 MG/DL (ref 0.2–1.1)
BUN BLD-MCNC: 10 MG/DL (ref 9–23)
BUN/CREAT SERPL: 14.1 (ref 10–20)
CALCIUM BLD-MCNC: 8.5 MG/DL (ref 8.7–10.4)
CHLORIDE SERPL-SCNC: 108 MMOL/L (ref 98–112)
CO2 SERPL-SCNC: 28 MMOL/L (ref 21–32)
CREAT BLD-MCNC: 0.71 MG/DL
DEPRECATED RDW RBC AUTO: 43.4 FL (ref 35.1–46.3)
EGFRCR SERPLBLD CKD-EPI 2021: 92 ML/MIN/1.73M2 (ref 60–?)
EOSINOPHIL # BLD AUTO: 0.06 X10(3) UL (ref 0–0.7)
EOSINOPHIL NFR BLD AUTO: 1.3 %
ERYTHROCYTE [DISTWIDTH] IN BLOOD BY AUTOMATED COUNT: 13.9 % (ref 11–15)
GLOBULIN PLAS-MCNC: 3 G/DL (ref 2.8–4.4)
GLUCOSE BLD-MCNC: 93 MG/DL (ref 70–99)
HCT VFR BLD AUTO: 33.5 %
HGB BLD-MCNC: 10.5 G/DL
IMM GRANULOCYTES # BLD AUTO: 0.01 X10(3) UL (ref 0–1)
IMM GRANULOCYTES NFR BLD: 0.2 %
LYMPHOCYTES # BLD AUTO: 1.16 X10(3) UL (ref 1–4)
LYMPHOCYTES NFR BLD AUTO: 24.6 %
MAGNESIUM SERPL-MCNC: 2 MG/DL (ref 1.6–2.6)
MCH RBC QN AUTO: 26.8 PG (ref 26–34)
MCHC RBC AUTO-ENTMCNC: 31.3 G/DL (ref 31–37)
MCV RBC AUTO: 85.5 FL
MONOCYTES # BLD AUTO: 0.26 X10(3) UL (ref 0.1–1)
MONOCYTES NFR BLD AUTO: 5.5 %
NEUTROPHILS # BLD AUTO: 3.21 X10 (3) UL (ref 1.5–7.7)
NEUTROPHILS # BLD AUTO: 3.21 X10(3) UL (ref 1.5–7.7)
NEUTROPHILS NFR BLD AUTO: 68.2 %
OSMOLALITY SERPL CALC.SUM OF ELEC: 291 MOSM/KG (ref 275–295)
PLATELET # BLD AUTO: 212 10(3)UL (ref 150–450)
POTASSIUM SERPL-SCNC: 3.7 MMOL/L (ref 3.5–5.1)
PROT SERPL-MCNC: 6.4 G/DL (ref 5.7–8.2)
RBC # BLD AUTO: 3.92 X10(6)UL
SODIUM SERPL-SCNC: 141 MMOL/L (ref 136–145)
WBC # BLD AUTO: 4.7 X10(3) UL (ref 4–11)

## 2023-12-30 PROCEDURE — 99233 SBSQ HOSP IP/OBS HIGH 50: CPT | Performed by: HOSPITALIST

## 2023-12-30 NOTE — PLAN OF CARE
Patient alert and oriented on room air with complaints of neck pain in AM- PRN given for relief. IVF running.  at bedside overnight. Call light in reach and no needs noted at this time.  Problem: Patient Centered Care  Goal: Patient preferences are identified and integrated in the patient's plan of care  Description: Interventions:  - What would you like us to know as we care for you? From home with   - Provide timely, complete, and accurate information to patient/family  - Incorporate patient and family knowledge, values, beliefs, and cultural backgrounds into the planning and delivery of care  - Encourage patient/family to participate in care and decision-making at the level they choose  - Honor patient and family perspectives and choices  Outcome: Progressing     Problem: Patient/Family Goals  Goal: Patient/Family Long Term Goal  Description: Patient's Long Term Goal: to discharge home    Interventions:  - imaging, pain management, therapy recommendations  - See additional Care Plan goals for specific interventions  Outcome: Progressing  Goal: Patient/Family Short Term Goal  Description: Patient's Short Term Goal: pain management    Interventions:   - pain meds  - See additional Care Plan goals for specific interventions  Outcome: Progressing     Problem: PAIN - ADULT  Goal: Verbalizes/displays adequate comfort level or patient's stated pain goal  Description: INTERVENTIONS:  - Encourage pt to monitor pain and request assistance  - Assess pain using appropriate pain scale  - Administer analgesics based on type and severity of pain and evaluate response  - Implement non-pharmacological measures as appropriate and evaluate response  - Consider cultural and social influences on pain and pain management  - Manage/alleviate anxiety  - Utilize distraction and/or relaxation techniques  - Monitor for opioid side effects  - Notify MD/LIP if interventions unsuccessful or patient reports new pain  - Anticipate  increased pain with activity and pre-medicate as appropriate  Outcome: Progressing     Problem: SAFETY ADULT - FALL  Goal: Free from fall injury  Description: INTERVENTIONS:  - Assess pt frequently for physical needs  - Identify cognitive and physical deficits and behaviors that affect risk of falls.  - [place] fall precautions as indicated by assessment.  - Educate pt/family on patient safety including physical limitations  - Instruct pt to call for assistance with activity based on assessment  - Modify environment to reduce risk of injury  - Provide assistive devices as appropriate  - Consider OT/PT consult to assist with strengthening/mobility  - Encourage toileting schedule  Outcome: Progressing     Problem: DISCHARGE PLANNING  Goal: Discharge to home or other facility with appropriate resources  Description: INTERVENTIONS:  - Identify barriers to discharge w/pt and caregiver  - Include patient/family/discharge partner in discharge planning  - Arrange for needed discharge resources and transportation as appropriate  - Identify discharge learning needs (meds, wound care, etc)  - Arrange for interpreters to assist at discharge as needed  - Consider post-discharge preferences of patient/family/discharge partner  - Complete POLST form as appropriate  - Assess patient's ability to be responsible for managing their own health  - Refer to Case Management Department for coordinating discharge planning if the patient needs post-hospital services based on physician/LIP order or complex needs related to functional status, cognitive ability or social support system  Outcome: Progressing     Problem: SKIN/TISSUE INTEGRITY - ADULT  Goal: Skin integrity remains intact  Description: INTERVENTIONS  - Assess and document risk factors for pressure ulcer development  - Assess and document skin integrity  - Monitor for areas of redness and/or skin breakdown  - Initiate interventions, skin care algorithm/standards of care as  needed  Outcome: Progressing     Problem: MUSCULOSKELETAL - ADULT  Goal: Return mobility to safest level of function  Description: INTERVENTIONS:  - Assess patient stability and activity tolerance for standing, transferring and ambulating w/ or w/o assistive devices  - Assist with transfers and ambulation using safe patient handling equipment as needed  - Ensure adequate protection for wounds/incisions during mobilization  - Obtain PT/OT consults as needed  - Advance activity as appropriate  - Communicate ordered activity level and limitations with patient/family  Outcome: Progressing  Goal: Maintain proper alignment of affected body part  Description: INTERVENTIONS:  - Support and protect limb and body alignment per provider's orders  - Instruct and reinforce with patient and family use of appropriate assistive device and precautions (e.g. spinal or hip dislocation precautions)  Outcome: Progressing     Problem: Impaired Functional Mobility  Goal: Achieve highest/safest level of mobility/gait  Description: Interventions:  - Assess patient's functional ability and stability  - Promote increasing activity/tolerance for mobility and gait  - Educate and engage patient/family in tolerated activity level and precautions  Outcome: Progressing     Problem: Impaired Activities of Daily Living  Goal: Achieve highest/safest level of independence in self care  Description: Interventions:  - Assess ability and encourage patient to participate in ADLs to maximize function  - Promote sitting position while performing ADLs such as feeding, grooming, and bathing  - Educate and encourage patient/family in tolerated functional activity level and precautions during self-care  Outcome: Progressing

## 2023-12-30 NOTE — PLAN OF CARE
Patient is Aox3, vitals stable on room air and some pain noted to body. Repositioning done though shift. IV fluids ongoing. Patient eating well. Frequent rounding done on pt and needs attended to.   Problem: Patient Centered Care  Goal: Patient preferences are identified and integrated in the patient's plan of care  Description: Interventions:  - What would you like us to know as we care for you? From home with   - Provide timely, complete, and accurate information to patient/family  - Incorporate patient and family knowledge, values, beliefs, and cultural backgrounds into the planning and delivery of care  - Encourage patient/family to participate in care and decision-making at the level they choose  - Honor patient and family perspectives and choices  Outcome: Progressing     Problem: Patient/Family Goals  Goal: Patient/Family Long Term Goal  Description: Patient's Long Term Goal: to discharge home    Interventions:  - imaging, pain management, therapy recommendations  - See additional Care Plan goals for specific interventions  Outcome: Progressing  Goal: Patient/Family Short Term Goal  Description: Patient's Short Term Goal: pain management    Interventions:   - pain meds  - See additional Care Plan goals for specific interventions  Outcome: Progressing     Problem: PAIN - ADULT  Goal: Verbalizes/displays adequate comfort level or patient's stated pain goal  Description: INTERVENTIONS:  - Encourage pt to monitor pain and request assistance  - Assess pain using appropriate pain scale  - Administer analgesics based on type and severity of pain and evaluate response  - Implement non-pharmacological measures as appropriate and evaluate response  - Consider cultural and social influences on pain and pain management  - Manage/alleviate anxiety  - Utilize distraction and/or relaxation techniques  - Monitor for opioid side effects  - Notify MD/LIP if interventions unsuccessful or patient reports new pain  -  Anticipate increased pain with activity and pre-medicate as appropriate  Outcome: Progressing     Problem: SAFETY ADULT - FALL  Goal: Free from fall injury  Description: INTERVENTIONS:  - Assess pt frequently for physical needs  - Identify cognitive and physical deficits and behaviors that affect risk of falls.  - [place] fall precautions as indicated by assessment.  - Educate pt/family on patient safety including physical limitations  - Instruct pt to call for assistance with activity based on assessment  - Modify environment to reduce risk of injury  - Provide assistive devices as appropriate  - Consider OT/PT consult to assist with strengthening/mobility  - Encourage toileting schedule  Outcome: Progressing     Problem: DISCHARGE PLANNING  Goal: Discharge to home or other facility with appropriate resources  Description: INTERVENTIONS:  - Identify barriers to discharge w/pt and caregiver  - Include patient/family/discharge partner in discharge planning  - Arrange for needed discharge resources and transportation as appropriate  - Identify discharge learning needs (meds, wound care, etc)  - Arrange for interpreters to assist at discharge as needed  - Consider post-discharge preferences of patient/family/discharge partner  - Complete POLST form as appropriate  - Assess patient's ability to be responsible for managing their own health  - Refer to Case Management Department for coordinating discharge planning if the patient needs post-hospital services based on physician/LIP order or complex needs related to functional status, cognitive ability or social support system  Outcome: Progressing     Problem: SKIN/TISSUE INTEGRITY - ADULT  Goal: Skin integrity remains intact  Description: INTERVENTIONS  - Assess and document risk factors for pressure ulcer development  - Assess and document skin integrity  - Monitor for areas of redness and/or skin breakdown  - Initiate interventions, skin care algorithm/standards of  care as needed  Outcome: Progressing     Problem: MUSCULOSKELETAL - ADULT  Goal: Return mobility to safest level of function  Description: INTERVENTIONS:  - Assess patient stability and activity tolerance for standing, transferring and ambulating w/ or w/o assistive devices  - Assist with transfers and ambulation using safe patient handling equipment as needed  - Ensure adequate protection for wounds/incisions during mobilization  - Obtain PT/OT consults as needed  - Advance activity as appropriate  - Communicate ordered activity level and limitations with patient/family  Outcome: Progressing  Goal: Maintain proper alignment of affected body part  Description: INTERVENTIONS:  - Support and protect limb and body alignment per provider's orders  - Instruct and reinforce with patient and family use of appropriate assistive device and precautions (e.g. spinal or hip dislocation precautions)  Outcome: Progressing     Problem: Impaired Functional Mobility  Goal: Achieve highest/safest level of mobility/gait  Description: Interventions:  - Assess patient's functional ability and stability  - Promote increasing activity/tolerance for mobility and gait  - Educate and engage patient/family in tolerated activity level and precautions  Outcome: Progressing     Problem: Impaired Activities of Daily Living  Goal: Achieve highest/safest level of independence in self care  Description: Interventions:  - Assess ability and encourage patient to participate in ADLs to maximize function  - Promote sitting position while performing ADLs such as feeding, grooming, and bathing  - Educate and encourage patient/family in tolerated functional activity level and precautions during self-care  Outcome: Progressing

## 2023-12-30 NOTE — PROGRESS NOTES
[facility]    Progress Note    Gita Bustamante Patient Status:  Inpatient    10/29/1955 MRN [id number]   Location [facility] 5SW/SE Attending Johnny Jeffers,*   Hosp Day # 2 PCP SUKHWINDER THOMPSON MD     Chief Complaint: neck hurts    Subjective:     Constitutional:  Positive for activity change.   HENT:  Negative for congestion.    Respiratory:  Negative for chest tightness.    Cardiovascular:  Negative for leg swelling.   Gastrointestinal:  Negative for abdominal pain.   Genitourinary:  Negative for dysuria.   Musculoskeletal:  Negative for joint swelling and joint pain.   Neurological:  Positive for weakness. Negative for light-headedness.   Psychiatric/Behavioral:  Positive for sleep disturbance. Negative for confusion. The patient is not nervous/anxious.        Objective:   Blood pressure 132/64, pulse 72, temperature 97.6 °F (36.4 °C), temperature source Oral, resp. rate 18, height 5' 6\" (1.676 m), weight 164 lb 1.6 oz (74.4 kg), SpO2 99%.  Physical Exam  Vitals and nursing note reviewed.   Constitutional:       Appearance: She is ill-appearing.   HENT:      Head: Normocephalic and atraumatic.   Cardiovascular:      Rate and Rhythm: Normal rate and regular rhythm.      Pulses: Normal pulses.      Heart sounds: Normal heart sounds.   Pulmonary:      Breath sounds: Rhonchi present.   Abdominal:      General: Bowel sounds are normal.      Palpations: Abdomen is soft.   Skin:     General: Skin is warm and dry.      Capillary Refill: Capillary refill takes less than 2 seconds.   Neurological:      General: No focal deficit present.      Mental Status: She is alert and oriented to person, place, and time.   Psychiatric:         Behavior: Behavior normal.         Judgment: Judgment normal.         Results:   Lab Results   Component Value Date    WBC 4.7 2023    HGB 10.5 (L) 2023    HCT 33.5 (L) 2023    .0 2023    CREATSERUM 0.71 2023    BUN 10  12/30/2023     12/30/2023    K 3.7 12/30/2023     12/30/2023    CO2 28.0 12/30/2023    GLU 93 12/30/2023    CA 8.5 (L) 12/30/2023    ALB 3.4 12/30/2023    ALKPHO 68 12/30/2023    BILT 0.4 12/30/2023    TP 6.4 12/30/2023    AST 16 12/30/2023    ALT 10 12/30/2023    MG 2.0 12/30/2023    TROP 0.00 12/30/2016       No results found.        Assessment & Plan:       Parietal subgaleal hematoma from fall may need rehab  CT reviewed  Continue neurochecks  Supportive management  PRN zofran  Zanaflex PRN  PT/OT  Orthostatic hypotension  Dizziness  Started on IVF  Monitor vitals  Daily orthostatic vitals  R Hip pain  Imaging reviewed, no fracture  PT/OT  Close follow up  R wrist contusion with possible distal radial impact fracture  Normal range of motion  Ortho on consult - non-surgical management  Outpatient follow up  GOC  Full code     Plan of care discussed with patient or family at bedside.              Supplementary Documentation:   Quality:  DVT Prophylaxis: SCDs  CODE status: Full  [place]: No  Central line: No        Estimated date of discharge: TBD  Discharge is dependent on: clinical stability  At this point Ms. Bustamante is expected to be discharge to: home vs mihir      complex mdm; coordinating care with nurse and counseling pt and with her permission her family in room about fall risk/head injury risk          MARY SMITH MD

## 2023-12-31 LAB
ANION GAP SERPL CALC-SCNC: 5 MMOL/L (ref 0–18)
BASOPHILS # BLD AUTO: 0.02 X10(3) UL (ref 0–0.2)
BASOPHILS NFR BLD AUTO: 0.4 %
BUN BLD-MCNC: 9 MG/DL (ref 9–23)
BUN/CREAT SERPL: 12.3 (ref 10–20)
CALCIUM BLD-MCNC: 8.6 MG/DL (ref 8.7–10.4)
CHLORIDE SERPL-SCNC: 110 MMOL/L (ref 98–112)
CO2 SERPL-SCNC: 28 MMOL/L (ref 21–32)
CREAT BLD-MCNC: 0.73 MG/DL
DEPRECATED RDW RBC AUTO: 42.7 FL (ref 35.1–46.3)
EGFRCR SERPLBLD CKD-EPI 2021: 89 ML/MIN/1.73M2 (ref 60–?)
EOSINOPHIL # BLD AUTO: 0.11 X10(3) UL (ref 0–0.7)
EOSINOPHIL NFR BLD AUTO: 2.1 %
ERYTHROCYTE [DISTWIDTH] IN BLOOD BY AUTOMATED COUNT: 13.7 % (ref 11–15)
GLUCOSE BLD-MCNC: 97 MG/DL (ref 70–99)
HCT VFR BLD AUTO: 34.5 %
HGB BLD-MCNC: 11.4 G/DL
IMM GRANULOCYTES # BLD AUTO: 0.01 X10(3) UL (ref 0–1)
IMM GRANULOCYTES NFR BLD: 0.2 %
LYMPHOCYTES # BLD AUTO: 1.87 X10(3) UL (ref 1–4)
LYMPHOCYTES NFR BLD AUTO: 36.2 %
MAGNESIUM SERPL-MCNC: 2 MG/DL (ref 1.6–2.6)
MCH RBC QN AUTO: 28.3 PG (ref 26–34)
MCHC RBC AUTO-ENTMCNC: 33 G/DL (ref 31–37)
MCV RBC AUTO: 85.6 FL
MONOCYTES # BLD AUTO: 0.34 X10(3) UL (ref 0.1–1)
MONOCYTES NFR BLD AUTO: 6.6 %
NEUTROPHILS # BLD AUTO: 2.81 X10 (3) UL (ref 1.5–7.7)
NEUTROPHILS # BLD AUTO: 2.81 X10(3) UL (ref 1.5–7.7)
NEUTROPHILS NFR BLD AUTO: 54.5 %
OSMOLALITY SERPL CALC.SUM OF ELEC: 295 MOSM/KG (ref 275–295)
PHOSPHATE SERPL-MCNC: 3.3 MG/DL (ref 2.4–5.1)
PLATELET # BLD AUTO: 204 10(3)UL (ref 150–450)
POTASSIUM SERPL-SCNC: 3.7 MMOL/L (ref 3.5–5.1)
RBC # BLD AUTO: 4.03 X10(6)UL
SODIUM SERPL-SCNC: 143 MMOL/L (ref 136–145)
WBC # BLD AUTO: 5.2 X10(3) UL (ref 4–11)

## 2023-12-31 NOTE — CM/SW NOTE
Received MDO and Vmail from Dr. Jeffers requesting SW to send LEO referrals.    SW sent LEO referrals via [employer]. PASRR completed and uploaded.    PLAN: Possible LEO - pending pt/family agreeable, list/choice, ins auth & med clear      SW/CM to remain available for support and/or discharge planning.           Sophy Wallace, MSW, LSW [phone]

## 2023-12-31 NOTE — PLAN OF CARE
Patient is Aox4, vitals stable on room air and meds given as ordered. Repositioned patient though shift and had patient ambulate in room. Patient mobility to neck improved more than yesterday. Frequent rounding done on pt and needs attended to.   Problem: Patient Centered Care  Goal: Patient preferences are identified and integrated in the patient's plan of care  Description: Interventions:  - What would you like us to know as we care for you? From home with   - Provide timely, complete, and accurate information to patient/family  - Incorporate patient and family knowledge, values, beliefs, and cultural backgrounds into the planning and delivery of care  - Encourage patient/family to participate in care and decision-making at the level they choose  - Honor patient and family perspectives and choices  Outcome: Progressing     Problem: Patient/Family Goals  Goal: Patient/Family Long Term Goal  Description: Patient's Long Term Goal: to discharge home    Interventions:  - imaging, pain management, therapy recommendations  - See additional Care Plan goals for specific interventions  Outcome: Progressing  Goal: Patient/Family Short Term Goal  Description: Patient's Short Term Goal: pain management    Interventions:   - pain meds  - See additional Care Plan goals for specific interventions  Outcome: Progressing     Problem: PAIN - ADULT  Goal: Verbalizes/displays adequate comfort level or patient's stated pain goal  Description: INTERVENTIONS:  - Encourage pt to monitor pain and request assistance  - Assess pain using appropriate pain scale  - Administer analgesics based on type and severity of pain and evaluate response  - Implement non-pharmacological measures as appropriate and evaluate response  - Consider cultural and social influences on pain and pain management  - Manage/alleviate anxiety  - Utilize distraction and/or relaxation techniques  - Monitor for opioid side effects  - Notify MD/LIP if interventions  unsuccessful or patient reports new pain  - Anticipate increased pain with activity and pre-medicate as appropriate  Outcome: Progressing     Problem: SAFETY ADULT - FALL  Goal: Free from fall injury  Description: INTERVENTIONS:  - Assess pt frequently for physical needs  - Identify cognitive and physical deficits and behaviors that affect risk of falls.  - [place] fall precautions as indicated by assessment.  - Educate pt/family on patient safety including physical limitations  - Instruct pt to call for assistance with activity based on assessment  - Modify environment to reduce risk of injury  - Provide assistive devices as appropriate  - Consider OT/PT consult to assist with strengthening/mobility  - Encourage toileting schedule  Outcome: Progressing     Problem: DISCHARGE PLANNING  Goal: Discharge to home or other facility with appropriate resources  Description: INTERVENTIONS:  - Identify barriers to discharge w/pt and caregiver  - Include patient/family/discharge partner in discharge planning  - Arrange for needed discharge resources and transportation as appropriate  - Identify discharge learning needs (meds, wound care, etc)  - Arrange for interpreters to assist at discharge as needed  - Consider post-discharge preferences of patient/family/discharge partner  - Complete POLST form as appropriate  - Assess patient's ability to be responsible for managing their own health  - Refer to Case Management Department for coordinating discharge planning if the patient needs post-hospital services based on physician/LIP order or complex needs related to functional status, cognitive ability or social support system  Outcome: Progressing     Problem: MUSCULOSKELETAL - ADULT  Goal: Return mobility to safest level of function  Description: INTERVENTIONS:  - Assess patient stability and activity tolerance for standing, transferring and ambulating w/ or w/o assistive devices  - Assist with transfers and ambulation using  safe patient handling equipment as needed  - Ensure adequate protection for wounds/incisions during mobilization  - Obtain PT/OT consults as needed  - Advance activity as appropriate  - Communicate ordered activity level and limitations with patient/family  Outcome: Progressing  Goal: Maintain proper alignment of affected body part  Description: INTERVENTIONS:  - Support and protect limb and body alignment per provider's orders  - Instruct and reinforce with patient and family use of appropriate assistive device and precautions (e.g. spinal or hip dislocation precautions)  Outcome: Progressing     Problem: Impaired Functional Mobility  Goal: Achieve highest/safest level of mobility/gait  Description: Interventions:  - Assess patient's functional ability and stability  - Promote increasing activity/tolerance for mobility and gait  - Educate and engage patient/family in tolerated activity level and precautions  Outcome: Progressing     Problem: Impaired Activities of Daily Living  Goal: Achieve highest/safest level of independence in self care  Description: Interventions:  - Assess ability and encourage patient to participate in ADLs to maximize function  - Promote sitting position while performing ADLs such as feeding, grooming, and bathing  - Educate and encourage patient/family in tolerated functional activity level and precautions during self-care  Outcome: Progressing

## 2023-12-31 NOTE — PHYSICAL THERAPY NOTE
PHYSICAL THERAPY TREATMENT NOTE - INPATIENT     Room Number: 553/553-A       Presenting Problem: fall down stairs, R distal radial fx (NWB; wrist brace; sling ordered; no surgical intervention); R parietal subgaleal hematoma (discussed with Dr. Lambert; OK to proceed with PT evaluation)       Problem List  Principal Problem:    Fall, initial encounter  Active Problems:    Concussion    Abrasion of right wrist, initial encounter    Concussion with loss of consciousness of 30 minutes or less, initial encounter    Hematoma of scalp, initial encounter      PHYSICAL THERAPY ASSESSMENT     Patient in bed with  present upon arrival. RN approved activity. Educated patient on POC and benefits of mobilization. Agreeable to participate. Patient reporting R hip and neck pain, rated 6 out of 10 per the pain scale. Re-enforced education on NWB RUE; patient with good compliance throughout. Patient continues to function below baseline levels, limited by impaired bed mobility, transfers, gait, stair negotiation, balance, strength, coordination, pain management, and tolerance for activity. Patient reports that while her double vision has improved, she continues to feel dizzy, unchanged with positional changes. BP noted as follows:  BP supine: 125/68 mmHg  BP sitting EOB: 143/83 mmHg  BP with activity: 148/68 mmHg    Bed Mobility: Mod A supine>EOB, via log roll technique. Patient tolerated sitting EOB approx 6 minutes, requiring BUE support and CGA in order to maintain static sitting balance.  Transfers: Min A sit<>stand with HHA; cues provided for appropriate UE placement during functional transfers. Instruction on activity pacing upon standing to allow body time to acclimate to change in position. Tolerated static standing with HHA and Min A for approx 1 minute prior to mobilization.  Ambulation: Min A x 2 with HHA for 20 ft; shuffled steps, decreased sobia speed, decreased step length, narrow ANTON, unsteady, no overt LOB.  Attempted to utilize rolling walker, however, patient unable to coordinate gait pattern while managing RW at this time.     Patient sitting in bedside chair at end of session. Needs in reach and alarm activated.  present. RN aware.      The patient's Approx Degree of Impairment: 61.29% has been calculated based on documentation in the [facility] '6 clicks' Inpatient Basic Mobility Short Form.  Research supports that patients with this level of impairment may benefit from subacute rehab in order to optimize functional outcomes.    DISCHARGE RECOMMENDATIONS  PT Discharge Recommendations: Sub-acute rehabilitation     PLAN  PT Treatment Plan: Bed mobility;Body mechanics;Coordination;Endurance;Energy conservation;Patient education;Gait training;Stair training;Transfer training;Balance training  Frequency (Obs): 3-5x/week    SUBJECTIVE  Agreeable     OBJECTIVE  Precautions: Limb alert - right;Bed/chair alarm    WEIGHT BEARING RESTRICTION  R Upper Extremity: Non-Weight Bearing    PAIN ASSESSMENT   Ratin  Location: neck and R hip  Management Techniques: Body mechanics    BALANCE  Static Sitting: Fair +  Dynamic Sitting: Fair  Static Standing: Fair -  Dynamic Standing: Poor +    ACTIVITY TOLERANCE  Pulse: 67  Heart Rate Source: Monitor     BP: 125/68 (143/83 mmHg sittin gEOB, 148/68 mmHg with activity)  BP Location: Left arm  BP Method: Automatic  Patient Position: Lying     O2 WALK  Oxygen Therapy  SPO2% on Room Air at Rest: 98    AM-PAC '6-Clicks' INPATIENT SHORT FORM - BASIC MOBILITY  How much difficulty does the patient currently have...  Patient Difficulty: Turning over in bed (including adjusting bedclothes, sheets and blankets)?: A Little   Patient Difficulty: Sitting down on and standing up from a chair with arms (e.g., wheelchair, bedside commode, etc.): A Little   Patient Difficulty: Moving from lying on back to sitting on the side of the bed?: A Lot   How much help from another person does the patient  currently need...   Help from Another: Moving to and from a bed to a chair (including a wheelchair)?: A Lot   Help from Another: Need to walk in hospital room?: A Lot   Help from Another: Climbing 3-5 steps with a railing?: A Lot     AM-PAC Score:  Raw Score: 14   Approx Degree of Impairment: 61.29%   Standardized Score (AM-PAC Scale): 38.1   CMS Modifier (G-Code): CL    FUNCTIONAL ABILITY STATUS  Functional Mobility/Gait Assessment  Gait Assistance: Minimum assistance (A x 2)  Distance (ft): 20  Assistive Device: Other (Comment) (HHA)  Pattern: Shuffle (decreased sobia speed, decreased step length, narrow ANTON, unsteady, no overt LOB)    Patient End of Session: Up in chair;Needs met;Call light within reach;RN aware of session/findings;All patient questions and concerns addressed;Alarm set;Family present    CURRENT GOALS   Goals to be met by: 23  Patient Goal Patient's self-stated goal is: none stated   Goal #1 Patient is able to demonstrate supine - sit EOB @ level: supervision      Goal #1   Current Status  Mod A   Goal #2 Patient is able to demonstrate transfers Sit to/from Stand at assistance level: CGA with  LRAD      Goal #2  Current Status  Min A with HHA   Goal #3 Patient is able to ambulate 50 feet with assist device:  LRAD  at assistance level: CGA   Goal #3   Current Status  Min A x 2 HHA 20 ft    Goal #4 Stair goal TBD pending progress   Goal #4   Current Status  N/A   Goal #5 Patient to demonstrate independence with home activity/exercise instructions provided to patient in preparation for discharge.   Goal #5   Current Status  Ongoing     Gait Trainin minutes  Therapeutic Activity: 15 minutes

## 2023-12-31 NOTE — OCCUPATIONAL THERAPY NOTE
OCCUPATIONAL THERAPY TREATMENT NOTE - INPATIENT    Room Number: 553/553-A           Problem List  Principal Problem:    Fall, initial encounter  Active Problems:    Concussion    Abrasion of right wrist, initial encounter    Concussion with loss of consciousness of 30 minutes or less, initial encounter    Hematoma of scalp, initial encounter      OCCUPATIONAL THERAPY ASSESSMENT   Patient care coordinated with PT; pt in bed with spouse present; pt with head fixed to L side; pt reports significant R neck pain; reinforced NWB on RUE; pt completed bed mobility with assist; was able to manage balance at EOB with CGA/SBA; pt stood with hand held assist; Min Ax2 ambulatory bathroom distances; very unsteady and poor tolerance for dynamic activities noted; pt requested to return to chair; declined further activities; pt educated on gentle ROM to improve neck positioning and midline positioning including shoulder rolls and shrugs and gentle stretching; pt verbalized understanding; pt left in chair with all needs in reach; stable at exit ;Continue skilled occupational therapy while IP to maximize patient function and increase patient participation, safety, and independence with basic ADL and everyday activities. The patient's Approx Degree of Impairment: 56.46% has been calculated based on documentation in the [facility] '6 clicks' Inpatient Daily Activity Short Form.  Research supports that patients with this level of impairment may benefit from LEO.    DISCHARGE RECOMMENDATIONS  OT Discharge Recommendations: Sub-acute rehabilitation    PLAN       SUBJECTIVE  Thank you     OBJECTIVE  Precautions: Limb alert - right;Bed/chair alarm    WEIGHT BEARING RESTRICTION  R Upper Extremity: Non-Weight Bearing    PAIN ASSESSMENT  Ratin  Location: neck  Management Techniques: Ice    ACTIVITIES OF DAILY LIVING ASSESSMENT  AM-PAC ‘6-Clicks’ Inpatient Daily Activity Short Form  How much help from another person does the patient currently  need…  -   Putting on and taking off regular lower body clothing?: A Lot  -   Bathing (including washing, rinsing, drying)?: A Lot  -   Toileting, which includes using toilet, bedpan or urinal? : A Lot  -   Putting on and taking off regular upper body clothing?: A Little  -   Taking care of personal grooming such as brushing teeth?: A Little  -   Eating meals?: A Little    AM-PAC Score:  Score: 15  Approx Degree of Impairment: 56.46%  Standardized Score (AM-PAC Scale): 34.69  CMS Modifier (G-Code): CK    FUNCTIONAL TRANSFER ASSESSMENT     BED MOBILITY  Supine to Sit : Minimal Assist  Sit to Supine (OT): Moderate Assist    BALANCE ASSESSMENT     FUNCTIONAL ADL ASSESSMENT  Eating: Minimal Assist  Grooming Seated: Minimal Assist  LB Dressing Seated: Dependent  Toileting Seated: Dependent       Patient End of Session: Up in chair    OT Goals:      OT Goals  Patients self stated goal is: to have less pain     Patient will complete functional transfer with CGA  Comment: Mod A x1    Patient will complete toileting with min A  Comment: ongoing    Patient will tolerate standing for 1 minutes in prep for adls with CGA   Comment:Min  Ax2      Patient will complete item retrieval with CGA  Comment:ongoing          Goals  on:   Frequency: 3-5x/wk    OT Session Time: 25 minutes  Self-Care Home Management: 10 minutes  Therapeutic Activity: 15 minutes

## 2023-12-31 NOTE — PROGRESS NOTES
[facility]    Progress Note    Gita Bustamante Patient Status:  Inpatient    10/29/1955 MRN [id number]   Location [facility] 5SW/SE Attending Johnny Jeffers,*   Hosp Day # 3 PCP SUKHWINDER THOMPSON MD     Chief Complaint: neck hurts but better    Subjective:     Constitutional:  Positive for activity change.   HENT:  Negative for congestion.    Respiratory:  Negative for chest tightness.    Cardiovascular:  Negative for leg swelling.   Gastrointestinal:  Negative for abdominal pain.   Genitourinary:  Negative for dysuria.   Musculoskeletal:  Negative for joint swelling and joint pain.   Neurological:  Positive for weakness. Negative for light-headedness.   Psychiatric/Behavioral:  Positive for sleep disturbance. Negative for confusion. The patient is not nervous/anxious.        Objective:   Blood pressure 143/83, pulse 62, temperature 97.8 °F (36.6 °C), temperature source Oral, resp. rate 18, height 5' 6\" (1.676 m), weight 164 lb 1.6 oz (74.4 kg), SpO2 98%.  Physical Exam  Vitals and nursing note reviewed.   Constitutional:       Appearance: She is ill-appearing.   HENT:      Head: Normocephalic and atraumatic.   Cardiovascular:      Rate and Rhythm: Normal rate and regular rhythm.      Pulses: Normal pulses.      Heart sounds: Normal heart sounds.   Pulmonary:      Breath sounds: Rhonchi present.   Abdominal:      General: Bowel sounds are normal.      Palpations: Abdomen is soft.   Skin:     General: Skin is warm and dry.      Capillary Refill: Capillary refill takes less than 2 seconds.   Neurological:      General: No focal deficit present.      Mental Status: She is alert and oriented to person, place, and time.   Psychiatric:         Behavior: Behavior normal.         Judgment: Judgment normal.         Results:   Lab Results   Component Value Date    WBC 5.2 2023    HGB 11.4 (L) 2023    HCT 34.5 (L) 2023    .0 2023    CREATSERUM 0.73 2023     BUN 9 12/31/2023     12/31/2023    K 3.7 12/31/2023     12/31/2023    CO2 28.0 12/31/2023    GLU 97 12/31/2023    CA 8.6 (L) 12/31/2023    ALB 3.4 12/30/2023    ALKPHO 68 12/30/2023    BILT 0.4 12/30/2023    TP 6.4 12/30/2023    AST 16 12/30/2023    ALT 10 12/30/2023    MG 2.0 12/31/2023    PHOS 3.3 12/31/2023    TROP 0.00 12/30/2016       No results found.        Assessment & Plan:       Parietal subgaleal hematoma from fall weal awaiting [facility] after rehab eval today note pending  CT reviewed  Continue neurochecks  Supportive management  PRN zofran  Zanaflex PRN  PT/OT  Orthostatic hypotension resolved  Dizziness  Started on ivf better  Monitor vitals  Daily orthostatic vitals  R Hip pain  Imaging reviewed, no fracture  PT/OT  Close follow up  R wrist contusion with possible distal radial impact fracture  Normal range of motion  Ortho on consult - non-surgical management  Outpatient follow up  GOC  Full code  7. Neck pain after fall ct ok better with lido     Plan of care discussed with patient or family at bedside.              Supplementary Documentation:   Quality:  DVT Prophylaxis: SCDs large hematoma scalp  CODE status: Full  Carter: No  Central line: No        Estimated date of discharge: TBD  Discharge is dependent on: clinical stability  At this point Ms. Bustamante is expected to be discharge to: mihir      complex mdm; coordinating care with nurse and counseling pt and with her permission her family in room about fall risk/head injury risk/need mihir SMITH MD

## 2023-12-31 NOTE — PLAN OF CARE
Patient vital signs stable overnight. IV fluids currently running. Lidocaine patch applied for pain and stiffness in neck. PRN Norco given for neck pain as well. Family member at bedside. No acute changes.    Problem: Patient Centered Care  Goal: Patient preferences are identified and integrated in the patient's plan of care  Description: Interventions:  - What would you like us to know as we care for you? From home with   - Provide timely, complete, and accurate information to patient/family  - Incorporate patient and family knowledge, values, beliefs, and cultural backgrounds into the planning and delivery of care  - Encourage patient/family to participate in care and decision-making at the level they choose  - Honor patient and family perspectives and choices  Outcome: Progressing     Problem: Patient/Family Goals  Goal: Patient/Family Long Term Goal  Description: Patient's Long Term Goal: to discharge home    Interventions:  - imaging, pain management, therapy recommendations  - See additional Care Plan goals for specific interventions  Outcome: Progressing  Goal: Patient/Family Short Term Goal  Description: Patient's Short Term Goal: pain management    Interventions:   - pain meds  - See additional Care Plan goals for specific interventions  Outcome: Progressing     Problem: PAIN - ADULT  Goal: Verbalizes/displays adequate comfort level or patient's stated pain goal  Description: INTERVENTIONS:  - Encourage pt to monitor pain and request assistance  - Assess pain using appropriate pain scale  - Administer analgesics based on type and severity of pain and evaluate response  - Implement non-pharmacological measures as appropriate and evaluate response  - Consider cultural and social influences on pain and pain management  - Manage/alleviate anxiety  - Utilize distraction and/or relaxation techniques  - Monitor for opioid side effects  - Notify MD/LIP if interventions unsuccessful or patient reports new  pain  - Anticipate increased pain with activity and pre-medicate as appropriate  Outcome: Progressing     Problem: SAFETY ADULT - FALL  Goal: Free from fall injury  Description: INTERVENTIONS:  - Assess pt frequently for physical needs  - Identify cognitive and physical deficits and behaviors that affect risk of falls.  - [place] fall precautions as indicated by assessment.  - Educate pt/family on patient safety including physical limitations  - Instruct pt to call for assistance with activity based on assessment  - Modify environment to reduce risk of injury  - Provide assistive devices as appropriate  - Consider OT/PT consult to assist with strengthening/mobility  - Encourage toileting schedule  Outcome: Progressing     Problem: DISCHARGE PLANNING  Goal: Discharge to home or other facility with appropriate resources  Description: INTERVENTIONS:  - Identify barriers to discharge w/pt and caregiver  - Include patient/family/discharge partner in discharge planning  - Arrange for needed discharge resources and transportation as appropriate  - Identify discharge learning needs (meds, wound care, etc)  - Arrange for interpreters to assist at discharge as needed  - Consider post-discharge preferences of patient/family/discharge partner  - Complete POLST form as appropriate  - Assess patient's ability to be responsible for managing their own health  - Refer to Case Management Department for coordinating discharge planning if the patient needs post-hospital services based on physician/LIP order or complex needs related to functional status, cognitive ability or social support system  Outcome: Progressing     Problem: SKIN/TISSUE INTEGRITY - ADULT  Goal: Skin integrity remains intact  Description: INTERVENTIONS  - Assess and document risk factors for pressure ulcer development  - Assess and document skin integrity  - Monitor for areas of redness and/or skin breakdown  - Initiate interventions, skin care  algorithm/standards of care as needed  Outcome: Progressing     Problem: MUSCULOSKELETAL - ADULT  Goal: Return mobility to safest level of function  Description: INTERVENTIONS:  - Assess patient stability and activity tolerance for standing, transferring and ambulating w/ or w/o assistive devices  - Assist with transfers and ambulation using safe patient handling equipment as needed  - Ensure adequate protection for wounds/incisions during mobilization  - Obtain PT/OT consults as needed  - Advance activity as appropriate  - Communicate ordered activity level and limitations with patient/family  Outcome: Progressing  Goal: Maintain proper alignment of affected body part  Description: INTERVENTIONS:  - Support and protect limb and body alignment per provider's orders  - Instruct and reinforce with patient and family use of appropriate assistive device and precautions (e.g. spinal or hip dislocation precautions)  Outcome: Progressing     Problem: Impaired Functional Mobility  Goal: Achieve highest/safest level of mobility/gait  Description: Interventions:  - Assess patient's functional ability and stability  - Promote increasing activity/tolerance for mobility and gait  - Educate and engage patient/family in tolerated activity level and precautions  - Recommend patient transfer to bedside chair toward strongest side  Outcome: Progressing     Problem: Impaired Activities of Daily Living  Goal: Achieve highest/safest level of independence in self care  Description: Interventions:  - Assess ability and encourage patient to participate in ADLs to maximize function  - Promote sitting position while performing ADLs such as feeding, grooming, and bathing  - Educate and encourage patient/family in tolerated functional activity level and precautions during self-care  - Encourage patient to incorporate impaired side during daily activities to promote function  Outcome: Progressing

## 2024-01-01 LAB
ANION GAP SERPL CALC-SCNC: 2 MMOL/L (ref 0–18)
BASOPHILS # BLD AUTO: 0.02 X10(3) UL (ref 0–0.2)
BASOPHILS NFR BLD AUTO: 0.4 %
BUN BLD-MCNC: 8 MG/DL (ref 9–23)
BUN/CREAT SERPL: 11.6 (ref 10–20)
CALCIUM BLD-MCNC: 8.2 MG/DL (ref 8.7–10.4)
CHLORIDE SERPL-SCNC: 109 MMOL/L (ref 98–112)
CO2 SERPL-SCNC: 30 MMOL/L (ref 21–32)
CREAT BLD-MCNC: 0.69 MG/DL
DEPRECATED RDW RBC AUTO: 42.7 FL (ref 35.1–46.3)
EGFRCR SERPLBLD CKD-EPI 2021: 94 ML/MIN/1.73M2 (ref 60–?)
EOSINOPHIL # BLD AUTO: 0.16 X10(3) UL (ref 0–0.7)
EOSINOPHIL NFR BLD AUTO: 3.2 %
ERYTHROCYTE [DISTWIDTH] IN BLOOD BY AUTOMATED COUNT: 13.7 % (ref 11–15)
GLUCOSE BLD-MCNC: 92 MG/DL (ref 70–99)
HCT VFR BLD AUTO: 32.8 %
HGB BLD-MCNC: 10.2 G/DL
IMM GRANULOCYTES # BLD AUTO: 0.01 X10(3) UL (ref 0–1)
IMM GRANULOCYTES NFR BLD: 0.2 %
LYMPHOCYTES # BLD AUTO: 1.76 X10(3) UL (ref 1–4)
LYMPHOCYTES NFR BLD AUTO: 35.2 %
MAGNESIUM SERPL-MCNC: 1.8 MG/DL (ref 1.6–2.6)
MCH RBC QN AUTO: 26.8 PG (ref 26–34)
MCHC RBC AUTO-ENTMCNC: 31.1 G/DL (ref 31–37)
MCV RBC AUTO: 86.1 FL
MONOCYTES # BLD AUTO: 0.32 X10(3) UL (ref 0.1–1)
MONOCYTES NFR BLD AUTO: 6.4 %
NEUTROPHILS # BLD AUTO: 2.73 X10 (3) UL (ref 1.5–7.7)
NEUTROPHILS # BLD AUTO: 2.73 X10(3) UL (ref 1.5–7.7)
NEUTROPHILS NFR BLD AUTO: 54.6 %
OSMOLALITY SERPL CALC.SUM OF ELEC: 290 MOSM/KG (ref 275–295)
PHOSPHATE SERPL-MCNC: 3.5 MG/DL (ref 2.4–5.1)
PLATELET # BLD AUTO: 190 10(3)UL (ref 150–450)
POTASSIUM SERPL-SCNC: 3.4 MMOL/L (ref 3.5–5.1)
RBC # BLD AUTO: 3.81 X10(6)UL
SODIUM SERPL-SCNC: 141 MMOL/L (ref 136–145)
WBC # BLD AUTO: 5 X10(3) UL (ref 4–11)

## 2024-01-01 PROCEDURE — 99232 SBSQ HOSP IP/OBS MODERATE 35: CPT | Performed by: HOSPITALIST

## 2024-01-01 RX ORDER — POTASSIUM CHLORIDE 20 MEQ/1
40 TABLET, EXTENDED RELEASE ORAL ONCE
Status: COMPLETED | OUTPATIENT
Start: 2024-01-01 | End: 2024-01-01

## 2024-01-01 RX ORDER — MAGNESIUM OXIDE 400 MG/1
400 TABLET ORAL ONCE
Status: COMPLETED | OUTPATIENT
Start: 2024-01-01 | End: 2024-01-01

## 2024-01-01 NOTE — PLAN OF CARE
Patient is AOx4, vitals stable on room air and meds given as ordered. IV fluids ongoing. Patient ambulatory with assistance and walker. Some pain noted to neck but improved after pain medications given. Frequent rounding done on pt and needs attended to.   Problem: Patient Centered Care  Goal: Patient preferences are identified and integrated in the patient's plan of care  Description: Interventions:  - What would you like us to know as we care for you? From home with   - Provide timely, complete, and accurate information to patient/family  - Incorporate patient and family knowledge, values, beliefs, and cultural backgrounds into the planning and delivery of care  - Encourage patient/family to participate in care and decision-making at the level they choose  - Honor patient and family perspectives and choices  Outcome: Progressing     Problem: Patient/Family Goals  Goal: Patient/Family Long Term Goal  Description: Patient's Long Term Goal: to discharge home    Interventions:  - imaging, pain management, therapy recommendations  - See additional Care Plan goals for specific interventions  Outcome: Progressing  Goal: Patient/Family Short Term Goal  Description: Patient's Short Term Goal: pain management    Interventions:   - pain meds  - See additional Care Plan goals for specific interventions  Outcome: Progressing     Problem: PAIN - ADULT  Goal: Verbalizes/displays adequate comfort level or patient's stated pain goal  Description: INTERVENTIONS:  - Encourage pt to monitor pain and request assistance  - Assess pain using appropriate pain scale  - Administer analgesics based on type and severity of pain and evaluate response  - Implement non-pharmacological measures as appropriate and evaluate response  - Consider cultural and social influences on pain and pain management  - Manage/alleviate anxiety  - Utilize distraction and/or relaxation techniques  - Monitor for opioid side effects  - Notify MD/LIP if  interventions unsuccessful or patient reports new pain  - Anticipate increased pain with activity and pre-medicate as appropriate  Outcome: Progressing     Problem: SAFETY ADULT - FALL  Goal: Free from fall injury  Description: INTERVENTIONS:  - Assess pt frequently for physical needs  - Identify cognitive and physical deficits and behaviors that affect risk of falls.  - [place] fall precautions as indicated by assessment.  - Educate pt/family on patient safety including physical limitations  - Instruct pt to call for assistance with activity based on assessment  - Modify environment to reduce risk of injury  - Provide assistive devices as appropriate  - Consider OT/PT consult to assist with strengthening/mobility  - Encourage toileting schedule  Outcome: Progressing     Problem: DISCHARGE PLANNING  Goal: Discharge to home or other facility with appropriate resources  Description: INTERVENTIONS:  - Identify barriers to discharge w/pt and caregiver  - Include patient/family/discharge partner in discharge planning  - Arrange for needed discharge resources and transportation as appropriate  - Identify discharge learning needs (meds, wound care, etc)  - Arrange for interpreters to assist at discharge as needed  - Consider post-discharge preferences of patient/family/discharge partner  - Complete POLST form as appropriate  - Assess patient's ability to be responsible for managing their own health  - Refer to Case Management Department for coordinating discharge planning if the patient needs post-hospital services based on physician/LIP order or complex needs related to functional status, cognitive ability or social support system  Outcome: Progressing     Problem: SKIN/TISSUE INTEGRITY - ADULT  Goal: Skin integrity remains intact  Description: INTERVENTIONS  - Assess and document risk factors for pressure ulcer development  - Assess and document skin integrity  - Monitor for areas of redness and/or skin breakdown  -  Initiate interventions, skin care algorithm/standards of care as needed  Outcome: Progressing     Problem: MUSCULOSKELETAL - ADULT  Goal: Return mobility to safest level of function  Description: INTERVENTIONS:  - Assess patient stability and activity tolerance for standing, transferring and ambulating w/ or w/o assistive devices  - Assist with transfers and ambulation using safe patient handling equipment as needed  - Ensure adequate protection for wounds/incisions during mobilization  - Obtain PT/OT consults as needed  - Advance activity as appropriate  - Communicate ordered activity level and limitations with patient/family  Outcome: Progressing  Goal: Maintain proper alignment of affected body part  Description: INTERVENTIONS:  - Support and protect limb and body alignment per provider's orders  - Instruct and reinforce with patient and family use of appropriate assistive device and precautions (e.g. spinal or hip dislocation precautions)  Outcome: Progressing     Problem: Impaired Functional Mobility  Goal: Achieve highest/safest level of mobility/gait  Description: Interventions:  - Assess patient's functional ability and stability  - Promote increasing activity/tolerance for mobility and gait  - Educate and engage patient/family in tolerated activity level and precautions  Outcome: Progressing     Problem: Impaired Activities of Daily Living  Goal: Achieve highest/safest level of independence in self care  Description: Interventions:  - Assess ability and encourage patient to participate in ADLs to maximize function  - Promote sitting position while performing ADLs such as feeding, grooming, and bathing  - Educate and encourage patient/family in tolerated functional activity level and precautions during self-care  Outcome: Progressing

## 2024-01-01 NOTE — PLAN OF CARE
Patient vital signs stable. PRN Norco given for neck pain. IV fluids on overnight. Family member at bedside. No acute changes.    Problem: Patient Centered Care  Goal: Patient preferences are identified and integrated in the patient's plan of care  Description: Interventions:  - What would you like us to know as we care for you? From home with   - Provide timely, complete, and accurate information to patient/family  - Incorporate patient and family knowledge, values, beliefs, and cultural backgrounds into the planning and delivery of care  - Encourage patient/family to participate in care and decision-making at the level they choose  - Honor patient and family perspectives and choices  Outcome: Progressing     Problem: Patient/Family Goals  Goal: Patient/Family Long Term Goal  Description: Patient's Long Term Goal: to discharge home    Interventions:  - imaging, pain management, therapy recommendations  - See additional Care Plan goals for specific interventions  Outcome: Progressing  Goal: Patient/Family Short Term Goal  Description: Patient's Short Term Goal: pain management    Interventions:   - pain meds  - See additional Care Plan goals for specific interventions  Outcome: Progressing     Problem: PAIN - ADULT  Goal: Verbalizes/displays adequate comfort level or patient's stated pain goal  Description: INTERVENTIONS:  - Encourage pt to monitor pain and request assistance  - Assess pain using appropriate pain scale  - Administer analgesics based on type and severity of pain and evaluate response  - Implement non-pharmacological measures as appropriate and evaluate response  - Consider cultural and social influences on pain and pain management  - Manage/alleviate anxiety  - Utilize distraction and/or relaxation techniques  - Monitor for opioid side effects  - Notify MD/LIP if interventions unsuccessful or patient reports new pain  - Anticipate increased pain with activity and pre-medicate as  appropriate  Outcome: Progressing     Problem: SAFETY ADULT - FALL  Goal: Free from fall injury  Description: INTERVENTIONS:  - Assess pt frequently for physical needs  - Identify cognitive and physical deficits and behaviors that affect risk of falls.  - [place] fall precautions as indicated by assessment.  - Educate pt/family on patient safety including physical limitations  - Instruct pt to call for assistance with activity based on assessment  - Modify environment to reduce risk of injury  - Provide assistive devices as appropriate  - Consider OT/PT consult to assist with strengthening/mobility  - Encourage toileting schedule  Outcome: Progressing     Problem: DISCHARGE PLANNING  Goal: Discharge to home or other facility with appropriate resources  Description: INTERVENTIONS:  - Identify barriers to discharge w/pt and caregiver  - Include patient/family/discharge partner in discharge planning  - Arrange for needed discharge resources and transportation as appropriate  - Identify discharge learning needs (meds, wound care, etc)  - Arrange for interpreters to assist at discharge as needed  - Consider post-discharge preferences of patient/family/discharge partner  - Complete POLST form as appropriate  - Assess patient's ability to be responsible for managing their own health  - Refer to Case Management Department for coordinating discharge planning if the patient needs post-hospital services based on physician/LIP order or complex needs related to functional status, cognitive ability or social support system  Outcome: Progressing     Problem: SKIN/TISSUE INTEGRITY - ADULT  Goal: Skin integrity remains intact  Description: INTERVENTIONS  - Assess and document risk factors for pressure ulcer development  - Assess and document skin integrity  - Monitor for areas of redness and/or skin breakdown  - Initiate interventions, skin care algorithm/standards of care as needed  Outcome: Progressing     Problem:  MUSCULOSKELETAL - ADULT  Goal: Return mobility to safest level of function  Description: INTERVENTIONS:  - Assess patient stability and activity tolerance for standing, transferring and ambulating w/ or w/o assistive devices  - Assist with transfers and ambulation using safe patient handling equipment as needed  - Ensure adequate protection for wounds/incisions during mobilization  - Obtain PT/OT consults as needed  - Advance activity as appropriate  - Communicate ordered activity level and limitations with patient/family  Outcome: Progressing  Goal: Maintain proper alignment of affected body part  Description: INTERVENTIONS:  - Support and protect limb and body alignment per provider's orders  - Instruct and reinforce with patient and family use of appropriate assistive device and precautions (e.g. spinal or hip dislocation precautions)  Outcome: Progressing     Problem: Impaired Functional Mobility  Goal: Achieve highest/safest level of mobility/gait  Description: Interventions:  - Assess patient's functional ability and stability  - Promote increasing activity/tolerance for mobility and gait  - Educate and engage patient/family in tolerated activity level and precautions  - Recommend use of  RW for transfers and ambulation  Outcome: Progressing     Problem: Impaired Activities of Daily Living  Goal: Achieve highest/safest level of independence in self care  Description: Interventions:  - Assess ability and encourage patient to participate in ADLs to maximize function  - Promote sitting position while performing ADLs such as feeding, grooming, and bathing  - Educate and encourage patient/family in tolerated functional activity level and precautions during self-care  - Provide support under elbow of weak side to prevent shoulder subluxation  Outcome: Progressing

## 2024-01-02 VITALS
HEART RATE: 76 BPM | HEIGHT: 66 IN | TEMPERATURE: 98 F | BODY MASS INDEX: 26.57 KG/M2 | OXYGEN SATURATION: 100 % | RESPIRATION RATE: 16 BRPM | WEIGHT: 165.31 LBS | SYSTOLIC BLOOD PRESSURE: 142 MMHG | DIASTOLIC BLOOD PRESSURE: 73 MMHG

## 2024-01-02 LAB
MAGNESIUM SERPL-MCNC: 1.9 MG/DL (ref 1.6–2.6)
POTASSIUM SERPL-SCNC: 3.5 MMOL/L (ref 3.5–5.1)

## 2024-01-02 PROCEDURE — 99239 HOSP IP/OBS DSCHRG MGMT >30: CPT | Performed by: HOSPITALIST

## 2024-01-02 RX ORDER — ACETAMINOPHEN 500 MG
500 TABLET ORAL EVERY 4 HOURS PRN
Status: SHIPPED | COMMUNITY
Start: 2024-01-02

## 2024-01-02 NOTE — PLAN OF CARE
Patient is alert and orientated x4. Vitals stable, room air. Remote tele in place, no calls from tele tech. Purewick in place. Tylenol PRN administered for pain. Right wrist splint in place.  Fall precautions in place. Call light in reach.   at bedside.    Problem: Patient Centered Care  Goal: Patient preferences are identified and integrated in the patient's plan of care  Description: Interventions:  - What would you like us to know as we care for you? From home with   - Provide timely, complete, and accurate information to patient/family  - Incorporate patient and family knowledge, values, beliefs, and cultural backgrounds into the planning and delivery of care  - Encourage patient/family to participate in care and decision-making at the level they choose  - Honor patient and family perspectives and choices  Outcome: Progressing     Problem: Patient/Family Goals  Goal: Patient/Family Long Term Goal  Description: Patient's Long Term Goal: to discharge home    Interventions:  - imaging, pain management, therapy recommendations  - See additional Care Plan goals for specific interventions  Outcome: Progressing  Goal: Patient/Family Short Term Goal  Description: Patient's Short Term Goal: pain management    Interventions:   - pain meds  - See additional Care Plan goals for specific interventions  Outcome: Progressing     Problem: PAIN - ADULT  Goal: Verbalizes/displays adequate comfort level or patient's stated pain goal  Description: INTERVENTIONS:  - Encourage pt to monitor pain and request assistance  - Assess pain using appropriate pain scale  - Administer analgesics based on type and severity of pain and evaluate response  - Implement non-pharmacological measures as appropriate and evaluate response  - Consider cultural and social influences on pain and pain management  - Manage/alleviate anxiety  - Utilize distraction and/or relaxation techniques  - Monitor for opioid side effects  - Notify MD/LIP  if interventions unsuccessful or patient reports new pain  - Anticipate increased pain with activity and pre-medicate as appropriate  Outcome: Progressing     Problem: SAFETY ADULT - FALL  Goal: Free from fall injury  Description: INTERVENTIONS:  - Assess pt frequently for physical needs  - Identify cognitive and physical deficits and behaviors that affect risk of falls.  - [place] fall precautions as indicated by assessment.  - Educate pt/family on patient safety including physical limitations  - Instruct pt to call for assistance with activity based on assessment  - Modify environment to reduce risk of injury  - Provide assistive devices as appropriate  - Consider OT/PT consult to assist with strengthening/mobility  - Encourage toileting schedule  Outcome: Progressing     Problem: DISCHARGE PLANNING  Goal: Discharge to home or other facility with appropriate resources  Description: INTERVENTIONS:  - Identify barriers to discharge w/pt and caregiver  - Include patient/family/discharge partner in discharge planning  - Arrange for needed discharge resources and transportation as appropriate  - Identify discharge learning needs (meds, wound care, etc)  - Arrange for interpreters to assist at discharge as needed  - Consider post-discharge preferences of patient/family/discharge partner  - Complete POLST form as appropriate  - Assess patient's ability to be responsible for managing their own health  - Refer to Case Management Department for coordinating discharge planning if the patient needs post-hospital services based on physician/LIP order or complex needs related to functional status, cognitive ability or social support system  Outcome: Progressing     Problem: SKIN/TISSUE INTEGRITY - ADULT  Goal: Skin integrity remains intact  Description: INTERVENTIONS  - Assess and document risk factors for pressure ulcer development  - Assess and document skin integrity  - Monitor for areas of redness and/or skin breakdown  -  Initiate interventions, skin care algorithm/standards of care as needed  Outcome: Progressing     Problem: MUSCULOSKELETAL - ADULT  Goal: Return mobility to safest level of function  Description: INTERVENTIONS:  - Assess patient stability and activity tolerance for standing, transferring and ambulating w/ or w/o assistive devices  - Assist with transfers and ambulation using safe patient handling equipment as needed  - Ensure adequate protection for wounds/incisions during mobilization  - Obtain PT/OT consults as needed  - Advance activity as appropriate  - Communicate ordered activity level and limitations with patient/family  Outcome: Progressing  Goal: Maintain proper alignment of affected body part  Description: INTERVENTIONS:  - Support and protect limb and body alignment per provider's orders  - Instruct and reinforce with patient and family use of appropriate assistive device and precautions (e.g. spinal or hip dislocation precautions)  Outcome: Progressing     Problem: Impaired Functional Mobility  Goal: Achieve highest/safest level of mobility/gait  Description: Interventions:  - Assess patient's functional ability and stability  - Promote increasing activity/tolerance for mobility and gait  - Educate and engage patient/family in tolerated activity level and precautions  - Recommend use of  RW for transfers and ambulation  Outcome: Progressing     Problem: Impaired Activities of Daily Living  Goal: Achieve highest/safest level of independence in self care  Description: Interventions:  - Assess ability and encourage patient to participate in ADLs to maximize function  - Promote sitting position while performing ADLs such as feeding, grooming, and bathing  - Educate and encourage patient/family in tolerated functional activity level and precautions during self-care  - Encourage patient to incorporate impaired side during daily activities to promote function  Outcome: Progressing

## 2024-01-02 NOTE — PROGRESS NOTES
[facility]    Progress Note    Gita Bustamante Patient Status:  Inpatient    10/29/1955 MRN [id number]   Location [facility] 5SW/SE Attending Johnny Jeffers,*   Hosp Day # 4 PCP SUKHWINDER THOMPSON MD     Chief Complaint: neck hurts but better    Subjective:     Constitutional:  Positive for activity change.   HENT:  Negative for congestion.    Respiratory:  Negative for chest tightness.    Cardiovascular:  Negative for leg swelling.   Gastrointestinal:  Negative for abdominal pain.   Genitourinary:  Negative for dysuria.   Musculoskeletal:  Negative for joint swelling and joint pain.   Neurological:  Positive for weakness. Negative for light-headedness.   Psychiatric/Behavioral:  Positive for sleep disturbance. Negative for confusion. The patient is not nervous/anxious.        Objective:   Blood pressure 126/57, pulse 70, temperature 97.6 °F (36.4 °C), temperature source Oral, resp. rate 18, height 5' 6\" (1.676 m), weight 165 lb 4.8 oz (75 kg), SpO2 98%.  Physical Exam  Vitals and nursing note reviewed.   Constitutional:       Appearance: She is ill-appearing.   HENT:      Head: Normocephalic and atraumatic.   Cardiovascular:      Rate and Rhythm: Normal rate and regular rhythm.      Pulses: Normal pulses.      Heart sounds: Normal heart sounds.   Pulmonary:      Breath sounds: Rhonchi present.   Abdominal:      General: Bowel sounds are normal.      Palpations: Abdomen is soft.   Skin:     General: Skin is warm and dry.      Capillary Refill: Capillary refill takes less than 2 seconds.   Neurological:      General: No focal deficit present.      Mental Status: She is alert and oriented to person, place, and time.   Psychiatric:         Behavior: Behavior normal.         Judgment: Judgment normal.         Results:   Lab Results   Component Value Date    WBC 5.0 2024    HGB 10.2 (L) 2024    HCT 32.8 (L) 2024    .0 2024    CREATSERUM 0.69 2024     BUN 8 (L) 01/01/2024     01/01/2024    K 3.4 (L) 01/01/2024     01/01/2024    CO2 30.0 01/01/2024    GLU 92 01/01/2024    CA 8.2 (L) 01/01/2024    ALB 3.4 12/30/2023    ALKPHO 68 12/30/2023    BILT 0.4 12/30/2023    TP 6.4 12/30/2023    AST 16 12/30/2023    ALT 10 12/30/2023    MG 1.8 01/01/2024    PHOS 3.5 01/01/2024    TROP 0.00 12/30/2016       No results found.        Assessment & Plan:       Parietal subgaleal hematoma from fall   CT reviewed  Continue neurochecks  Supportive management  PRN zofran  Zanaflex PRN  PT/OT  Orthostatic hypotension resolved  Dizziness  Started on ivf better  Monitor vitals  Daily orthostatic vitals  R Hip pain  Imaging reviewed, no fracture  PT/OT  Close follow up  R wrist contusion with possible distal radial impact fracture  Normal range of motion  Ortho on consult - non-surgical management  Outpatient follow up  GOC  Full code  7. Neck pain after fall ct ok better with lido     Plan of care discussed with patient              Supplementary Documentation:   Quality:  DVT Prophylaxis: SCDs large hematoma scalp  CODE status: Full  Carter: No  Central line: No        Estimated date of discharge: TBD  Discharge is dependent on: clinical stability  At this point Ms. Bustamante is expected to be discharge to: [facility]      complex mdm; coordinating care with nurse and counseling pt and with her permission her family in room about fall risk/head injury risk/need [facility]

## 2024-01-02 NOTE — DISCHARGE SUMMARY
[facility]     DISCHARGE SUMMARY     Gita uBstamante Patient Status:  Inpatient    10/29/1955 MRN [id number]   Location [facility] 5SW/SE Attending No att. providers found   Hosp Day # 5 PCP SUKHWINDER THOMPSON MD     DATE OF ADMISSION: 2023  DATE OF DISCHARGE: 2024   DISPOSITION: home  CONDITION ON DISCHARGE: good    DISCHARGE DIAGNOSES:  Some sungaleal hematoma  Mechanical fall  Orthostatic hypotension  Dizziness  Right hip pain  Right wrist contusion with possible distal radial impacted fracture    HISTORY OF PRESENT ILLNESS (COPIED FROM ADMISSION H&P)  Gita Bustamante is a 68 year old female with no significant PMHx presented to the ER with her  after a fall. The patient fell down 5 concrete steps after her grandchildren ran into her.   The patient did not lose consciousness and did not injury any of her bony prominences.   She has poor short term memory.    HOSPITAL COURSE:  Patient was admitted.  Imaging was extensive but only found possible distal radial impacted fracture.  Orthopedics was on consult and did not recommendSurgical intervention.  She was put in a immobilizer and will follow-up with orthopedics in about 2 weeks.  Pain was well-controlled.  Blood pressure is normalized.  Official recommendations for subacute rehab.  Patient and family politely declined this and will be going home.  She has lots of help at home.    Patient understands return to the emergency room for increased pain, fever, discharge, shortness of breath, chest pain, new neurologic symptoms, other concerning symptoms.    PHYSICAL EXAM:  Temp:  [98.2 °F (36.8 °C)-98.5 °F (36.9 °C)] 98.2 °F (36.8 °C)  Pulse:  [67-76] 76  Resp:  [16] 16  BP: (124-142)/(62-85) 142/73  SpO2:  [96 %-100 %] 100 %  Gen: A+Ox3.  No distress.   HEENT: NCAT, neck supple, no carotid bruit.  CV: RRR, S1S2, and intact distal pulses. No gallop, rub, murmur.  Pulm: Effort and breath sounds normal. No distress, wheezes,  rales, rhonchi.  Abd: Soft, NTND, BS normal, no mass, no HSM, no rebound/guarding.   Neuro: Normal reflexes, CN. Sensory/motor exams grossly normal deficit. Coordination  and gait normal.   MS: No joint effusions.  No peripheral edema.  Skin: Skin is warm and dry. No rashes, erythema, diaphoresis.   Psych: Normal mood and affect. Behavior and judgment normal.     DISCHARGE MEDICATIONS     Discharge Medications        START taking these medications        Instructions Prescription details   acetaminophen 500 MG Tabs  Commonly known as: Tylenol Extra Strength      Take 1 tablet (500 mg total) by mouth every 4 (four) hours as needed for Fever (equal to or greater than 100.4).   Refills: 0              CONSULTANTS  Consultants         Provider   Role Specialty     Jamari Garcia MD  Consulting Physician SURGERY, ORTHOPEDIC     Behery, Yovani Tate MD  Consulting Physician Surgery, Orthopaedic            FOLLOW UP:  Loy Metzger MD    Follow up in 1 week(s)  Post Discharge Followup    Jamari Garcia MD  [address]  [address]  [place] [address]  [phone]    Follow up in 3 week(s)  Post Discharge Followup -   Call [phone] for appointment.    The above plan and follow-up instructions were reviewed with the patient and they verbalized understanding and agreement.  They understand to return to the emergency room for any concerning signs or symptoms.  Greater than 30 minutes spent on discharge.  -----------------------    Hospital Discharge Diagnoses:  fall    Lace+ Score: 40  59-90 High Risk  29-58 Medium Risk  0-28   Low Risk.    TCM Follow-Up Recommendation:  LACE 29-58: Moderate Risk of readmission after discharge from the hospital.

## 2024-01-02 NOTE — PLAN OF CARE
Cleared to discharge home. Refused [facility]. [facility] set up by NABIL. IV removed by this RN. Instructions for discharge reviewed with  at bedside.       Problem: Patient Centered Care  Goal: Patient preferences are identified and integrated in the patient's plan of care  Description: Interventions:  - What would you like us to know as we care for you? From home with   - Provide timely, complete, and accurate information to patient/family  - Incorporate patient and family knowledge, values, beliefs, and cultural backgrounds into the planning and delivery of care  - Encourage patient/family to participate in care and decision-making at the level they choose  - Honor patient and family perspectives and choices  Outcome: Adequate for Discharge     Problem: Patient/Family Goals  Goal: Patient/Family Long Term Goal  Description: Patient's Long Term Goal: to discharge home    Interventions:  - imaging, pain management, therapy recommendations  - See additional Care Plan goals for specific interventions  Outcome: Adequate for Discharge  Goal: Patient/Family Short Term Goal  Description: Patient's Short Term Goal: pain management    Interventions:   - pain meds  - See additional Care Plan goals for specific interventions  Outcome: Adequate for Discharge     Problem: PAIN - ADULT  Goal: Verbalizes/displays adequate comfort level or patient's stated pain goal  Description: INTERVENTIONS:  - Encourage pt to monitor pain and request assistance  - Assess pain using appropriate pain scale  - Administer analgesics based on type and severity of pain and evaluate response  - Implement non-pharmacological measures as appropriate and evaluate response  - Consider cultural and social influences on pain and pain management  - Manage/alleviate anxiety  - Utilize distraction and/or relaxation techniques  - Monitor for opioid side effects  - Notify MD/LIP if interventions unsuccessful or patient reports new pain  - Anticipate increased  pain with activity and pre-medicate as appropriate  Outcome: Adequate for Discharge     Problem: SAFETY ADULT - FALL  Goal: Free from fall injury  Description: INTERVENTIONS:  - Assess pt frequently for physical needs  - Identify cognitive and physical deficits and behaviors that affect risk of falls.  - [place] fall precautions as indicated by assessment.  - Educate pt/family on patient safety including physical limitations  - Instruct pt to call for assistance with activity based on assessment  - Modify environment to reduce risk of injury  - Provide assistive devices as appropriate  - Consider OT/PT consult to assist with strengthening/mobility  - Encourage toileting schedule  Outcome: Adequate for Discharge     Problem: DISCHARGE PLANNING  Goal: Discharge to home or other facility with appropriate resources  Description: INTERVENTIONS:  - Identify barriers to discharge w/pt and caregiver  - Include patient/family/discharge partner in discharge planning  - Arrange for needed discharge resources and transportation as appropriate  - Identify discharge learning needs (meds, wound care, etc)  - Arrange for interpreters to assist at discharge as needed  - Consider post-discharge preferences of patient/family/discharge partner  - Complete POLST form as appropriate  - Assess patient's ability to be responsible for managing their own health  - Refer to Case Management Department for coordinating discharge planning if the patient needs post-hospital services based on physician/LIP order or complex needs related to functional status, cognitive ability or social support system  Outcome: Adequate for Discharge     Problem: SKIN/TISSUE INTEGRITY - ADULT  Goal: Skin integrity remains intact  Description: INTERVENTIONS  - Assess and document risk factors for pressure ulcer development  - Assess and document skin integrity  - Monitor for areas of redness and/or skin breakdown  - Initiate interventions, skin care  algorithm/standards of care as needed  Outcome: Adequate for Discharge     Problem: MUSCULOSKELETAL - ADULT  Goal: Return mobility to safest level of function  Description: INTERVENTIONS:  - Assess patient stability and activity tolerance for standing, transferring and ambulating w/ or w/o assistive devices  - Assist with transfers and ambulation using safe patient handling equipment as needed  - Ensure adequate protection for wounds/incisions during mobilization  - Obtain PT/OT consults as needed  - Advance activity as appropriate  - Communicate ordered activity level and limitations with patient/family  Outcome: Adequate for Discharge  Goal: Maintain proper alignment of affected body part  Description: INTERVENTIONS:  - Support and protect limb and body alignment per provider's orders  - Instruct and reinforce with patient and family use of appropriate assistive device and precautions (e.g. spinal or hip dislocation precautions)  Outcome: Adequate for Discharge     Problem: Impaired Functional Mobility  Goal: Achieve highest/safest level of mobility/gait  Description: Interventions:  - Assess patient's functional ability and stability  - Promote increasing activity/tolerance for mobility and gait  - Educate and engage patient/family in tolerated activity level and precautions  Outcome: Adequate for Discharge     Problem: Impaired Activities of Daily Living  Goal: Achieve highest/safest level of independence in self care  Description: Interventions:  - Assess ability and encourage patient to participate in ADLs to maximize function  - Promote sitting position while performing ADLs such as feeding, grooming, and bathing  - Educate and encourage patient/family in tolerated functional activity level and precautions during self-care  Outcome: Adequate for Discharge

## 2024-01-02 NOTE — DISCHARGE INSTRUCTIONS
Sometimes managing your health at home requires assistance.  The [facility] team has recognized your preference to use [facility], Phone: [phone] or Fax: [phone]. A representative from the home health agency will contact you or your family to schedule your first visit.

## 2024-01-02 NOTE — CM/SW NOTE
01/02/24 1300   Discharge disposition   Expected discharge disposition Home-Health   Post Acute Care Provider   ([facility])   Discharge transportation Private car     Pt discussed during nursing rounds. Pt is stable for dc today. MD dc order entered. Pt continues to decline [facility] despite recommendation. [facility] is only accepting HH agency at [place]. Agency reserved in [facility] and notified of dc home today. Pt's spouse will provide transport at [place].    Plan: Home w/family with [facility] today.    / to remain available for support and/or discharge planning.     MARY JANE Granados    [phone]

## 2024-01-02 NOTE — CM/SW NOTE
List of accepting [facility] facilities provided to patient at bedside. Pt agreeable to review list w/her spouse and provide choice later today. Ins auth will be needed for [facility] prior to dc.    1305: PT declining [facility] at dc. Pt agreeable to HH w/therapies at [place]. HH referrals sent in [facility]IN. List of accepting agencies will be needed for choice.    Plan: Home wfamily with HH pending agency choice and medical clearance.    WOO GranadosN    [phone]

## 2024-01-08 ENCOUNTER — HOSPITAL ENCOUNTER (OUTPATIENT)
Dept: GENERAL RADIOLOGY | Facility: HOSPITAL | Age: 69
Discharge: HOME | End: 2024-01-08
Attending: FAMILY MEDICINE
Payer: COMMERCIAL

## 2024-01-08 DIAGNOSIS — M25.522 LEFT ELBOW PAIN: ICD-10-CM

## 2024-01-08 PROCEDURE — 73080 X-RAY EXAM OF ELBOW: CPT | Performed by: FAMILY MEDICINE

## 2024-01-11 NOTE — PAYOR COMM NOTE
--------------  CONTINUED STAY REVIEW-----REQUESTING ADDITIONAL DAYS 12/30 12/31 1/1 WITH DISCHARGED 1/2      Payor: [employer] [facility]  Subscriber #:  [id number]  Authorization Number: [id number]    Admit date: 12/28/23  Admit time: 10:19 AM    Admitting Physician: Vivian Lambert MD  Attending Physician:  No att. providers found  Primary Care Physician: Loy Metzger MD         12/30   Chief Complaint: neck hurts     Subjective:      Constitutional:  Positive for activity change.   HENT:  Negative for congestion.    Respiratory:  Negative for chest tightness.    Cardiovascular:  Negative for leg swelling.   Gastrointestinal:  Negative for abdominal pain.   Genitourinary:  Negative for dysuria.   Musculoskeletal:  Negative for joint swelling and joint pain.   Neurological:  Positive for weakness. Negative for light-headedness.   Psychiatric/Behavioral:  Positive for sleep disturbance. Negative for confusion. The patient is not nervous/anxious.          Objective:   Blood pressure 132/64, pulse 72, temperature 97.6 °F (36.4 °C), temperature source Oral, resp. rate 18, height 5' 6\" (1.676 m), weight 164 lb 1.6 oz (74.4 kg), SpO2 99%.  Physical Exam  Vitals and nursing note reviewed.   Constitutional:       Appearance: She is ill-appearing.   HENT:      Head: Normocephalic and atraumatic.   Cardiovascular:      Rate and Rhythm: Normal rate and regular rhythm.      Pulses: Normal pulses.      Heart sounds: Normal heart sounds.   Pulmonary:      Breath sounds: Rhonchi present.   Abdominal:      General: Bowel sounds are normal.      Palpations: Abdomen is soft.   Skin:     General: Skin is warm and dry.      Capillary Refill: Capillary refill takes less than 2 seconds.   Neurological:      General: No focal deficit present.      Mental Status: She is alert and oriented to person, place, and time.   Psychiatric:         Behavior: Behavior normal.         Judgment: Judgment normal.            Results:         Lab  Results   Component Value Date     WBC 4.7 12/30/2023     HGB 10.5 (L) 12/30/2023     HCT 33.5 (L) 12/30/2023     .0 12/30/2023     CREATSERUM 0.71 12/30/2023     BUN 10 12/30/2023      12/30/2023     K 3.7 12/30/2023      12/30/2023     CO2 28.0 12/30/2023     GLU 93 12/30/2023     CA 8.5 (L) 12/30/2023     ALB 3.4 12/30/2023     ALKPHO 68 12/30/2023     BILT 0.4 12/30/2023     TP 6.4 12/30/2023     AST 16 12/30/2023     ALT 10 12/30/2023     MG 2.0 12/30/2023     TROP 0.00 12/30/2016         No results found.         Assessment & Plan:         Parietal subgaleal hematoma from fall may need rehab  CT reviewed  Continue neurochecks  Supportive management  PRN zofran  Zanaflex PRN  PT/OT  Orthostatic hypotension  Dizziness  Started on IVF  Monitor vitals  Daily orthostatic vitals  R Hip pain  Imaging reviewed, no fracture  PT/OT  Close follow up  R wrist contusion with possible distal radial impact fracture  Normal range of motion  Ortho on consult - non-surgical management  Outpatient follow up  [facility]  Full code     Plan of care discussed with patient or family at bedside.            Estimated date of discharge: TBD  Discharge is dependent on: clinical stability  At this point Ms. Bustamante is expected to be discharge to: home vs [facility]      complex mdm; coordinating care with nurse and counseling pt and with her permission her family in room about fall risk/head injury risk              MARY SMITH MD                 12/31   Chief Complaint: neck hurts but better     Subjective:      Constitutional:  Positive for activity change.   HENT:  Negative for congestion.    Respiratory:  Negative for chest tightness.    Cardiovascular:  Negative for leg swelling.   Gastrointestinal:  Negative for abdominal pain.   Genitourinary:  Negative for dysuria.   Musculoskeletal:  Negative for joint swelling and joint pain.   Neurological:  Positive for weakness. Negative for light-headedness.    Psychiatric/Behavioral:  Positive for sleep disturbance. Negative for confusion. The patient is not nervous/anxious.          Objective:   Blood pressure 143/83, pulse 62, temperature 97.8 °F (36.6 °C), temperature source Oral, resp. rate 18, height 5' 6\" (1.676 m), weight 164 lb 1.6 oz (74.4 kg), SpO2 98%.  Physical Exam  Vitals and nursing note reviewed.   Constitutional:       Appearance: She is ill-appearing.   HENT:      Head: Normocephalic and atraumatic.   Cardiovascular:      Rate and Rhythm: Normal rate and regular rhythm.      Pulses: Normal pulses.      Heart sounds: Normal heart sounds.   Pulmonary:      Breath sounds: Rhonchi present.   Abdominal:      General: Bowel sounds are normal.      Palpations: Abdomen is soft.   Skin:     General: Skin is warm and dry.      Capillary Refill: Capillary refill takes less than 2 seconds.   Neurological:      General: No focal deficit present.      Mental Status: She is alert and oriented to person, place, and time.   Psychiatric:         Behavior: Behavior normal.         Judgment: Judgment normal.            Results:         Lab Results   Component Value Date     WBC 5.2 12/31/2023     HGB 11.4 (L) 12/31/2023     HCT 34.5 (L) 12/31/2023     .0 12/31/2023     CREATSERUM 0.73 12/31/2023     BUN 9 12/31/2023      12/31/2023     K 3.7 12/31/2023      12/31/2023     CO2 28.0 12/31/2023     GLU 97 12/31/2023     CA 8.6 (L) 12/31/2023     ALB 3.4 12/30/2023     ALKPHO 68 12/30/2023     BILT 0.4 12/30/2023     TP 6.4 12/30/2023     AST 16 12/30/2023     ALT 10 12/30/2023     MG 2.0 12/31/2023     PHOS 3.3 12/31/2023     TROP 0.00 12/30/2016         No results found.         Assessment & Plan:         Parietal subgaleal hematoma from fall weal awaiting mi[facility] after rehab eval today note pending  CT reviewed  Continue neurochecks  Supportive management  PRN zofran  Zanaflex PRN  PT/OT  Orthostatic hypotension resolved  Dizziness  Started on ivf  better  Monitor vitals  Daily orthostatic vitals  R Hip pain  Imaging reviewed, no fracture  PT/OT  Close follow up  R wrist contusion with possible distal radial impact fracture  Normal range of motion  Ortho on consult - non-surgical management  Outpatient follow up  GO  Full code  7. Neck pain after fall ct ok better with lido     Plan of care discussed with patient or family at bedside.         Estimated date of discharge: TBD  Discharge is dependent on: clinical stability  At this point Ms. Bustamante is expected to be discharge to: [facility]      complex mdm; coordinating care with nurse and counseling pt and with her permission her family in room about fall risk/head injury risk/need mi[facility] SMITH MD              1/1  Chief Complaint: neck hurts but better     Subjective:      Constitutional:  Positive for activity change.   HENT:  Negative for congestion.    Respiratory:  Negative for chest tightness.    Cardiovascular:  Negative for leg swelling.   Gastrointestinal:  Negative for abdominal pain.   Genitourinary:  Negative for dysuria.   Musculoskeletal:  Negative for joint swelling and joint pain.   Neurological:  Positive for weakness. Negative for light-headedness.   Psychiatric/Behavioral:  Positive for sleep disturbance. Negative for confusion. The patient is not nervous/anxious.          Objective:   Blood pressure 126/57, pulse 70, temperature 97.6 °F (36.4 °C), temperature source Oral, resp. rate 18, height 5' 6\" (1.676 m), weight 165 lb 4.8 oz (75 kg), SpO2 98%.  Physical Exam  Vitals and nursing note reviewed.   Constitutional:       Appearance: She is ill-appearing.   HENT:      Head: Normocephalic and atraumatic.   Cardiovascular:      Rate and Rhythm: Normal rate and regular rhythm.      Pulses: Normal pulses.      Heart sounds: Normal heart sounds.   Pulmonary:      Breath sounds: Rhonchi present.   Abdominal:      General: Bowel sounds are normal.      Palpations: Abdomen  is soft.   Skin:     General: Skin is warm and dry.      Capillary Refill: Capillary refill takes less than 2 seconds.   Neurological:      General: No focal deficit present.      Mental Status: She is alert and oriented to person, place, and time.   Psychiatric:         Behavior: Behavior normal.         Judgment: Judgment normal.            Results:         Lab Results   Component Value Date     WBC 5.0 01/01/2024     HGB 10.2 (L) 01/01/2024     HCT 32.8 (L) 01/01/2024     .0 01/01/2024     CREATSERUM 0.69 01/01/2024     BUN 8 (L) 01/01/2024      01/01/2024     K 3.4 (L) 01/01/2024      01/01/2024     CO2 30.0 01/01/2024     GLU 92 01/01/2024     CA 8.2 (L) 01/01/2024     ALB 3.4 12/30/2023     ALKPHO 68 12/30/2023     BILT 0.4 12/30/2023     TP 6.4 12/30/2023     AST 16 12/30/2023     ALT 10 12/30/2023     MG 1.8 01/01/2024     PHOS 3.5 01/01/2024     TROP 0.00 12/30/2016         No results found.         Assessment & Plan:         Parietal subgaleal hematoma from fall   CT reviewed  Continue neurochecks  Supportive management  PRN zofran  Zanaflex PRN  PT/OT  Orthostatic hypotension resolved  Dizziness  Started on ivf better  Monitor vitals  Daily orthostatic vitals  R Hip pain  Imaging reviewed, no fracture  PT/OT  Close follow up  R wrist contusion with possible distal radial impact fracture  Normal range of motion  Ortho on consult - non-surgical management  Outpatient follow up  [facility]  Full code  7. Neck pain after fall ct ok better with lido     Plan of care discussed with patient         Estimated date of discharge: TBD  Discharge is dependent on: clinical stability  At this point Ms. Bustamante is expected to be discharge to: [facility]      complex mdm; coordinating care with nurse and counseling pt and with her permission her family in room about fall risk/head injury risk/need mi[facility]         1/2 DISCHARGED  [facility]     DISCHARGE SUMMARY            Gita Bustamante Patient  Status:  Inpatient    10/29/1955 MRN [id number]   Location [facility] 5SW/SE Attending No att. providers found   Hosp Day # 5 PCP SUKHWINDER THOMPSON MD      DATE OF ADMISSION: 2023  DATE OF DISCHARGE: 2024   DISPOSITION: home  CONDITION ON DISCHARGE: good     DISCHARGE DIAGNOSES:  Some sungaleal hematoma  Mechanical fall  Orthostatic hypotension  Dizziness  Right hip pain  Right wrist contusion with possible distal radial impacted fracture     HISTORY OF PRESENT ILLNESS (COPIED FROM ADMISSION H&P)  Gita Bustamante is a 68 year old female with no significant PMHx presented to the ER with her  after a fall. The patient fell down 5 concrete steps after her grandchildren ran into her.   The patient did not lose consciousness and did not injury any of her bony prominences.   She has poor short term memory.     HOSPITAL COURSE:  Patient was admitted.  Imaging was extensive but only found possible distal radial impacted fracture.  Orthopedics was on consult and did not recommendSurgical intervention.  She was put in a immobilizer and will follow-up with orthopedics in about 2 weeks.  Pain was well-controlled.  Blood pressure is normalized.  Official recommendations for subacute rehab.  Patient and family politely declined this and will be going home.  She has lots of help at home.     Patient understands return to the emergency room for increased pain, fever, discharge, shortness of breath, chest pain, new neurologic symptoms, other concerning symptoms.     PHYSICAL EXAM:  Temp:  [98.2 °F (36.8 °C)-98.5 °F (36.9 °C)] 98.2 °F (36.8 °C)  Pulse:  [67-76] 76  Resp:  [16] 16  BP: (124-142)/(62-85) 142/73  SpO2:  [96 %-100 %] 100 %  Gen: A+Ox3.  No distress.   HEENT: NCAT, neck supple, no carotid bruit.  CV: RRR, S1S2, and intact distal pulses. No gallop, rub, murmur.  Pulm: Effort and breath sounds normal. No distress, wheezes, rales, rhonchi.  Abd: Soft, NTND, BS normal, no mass, no HSM, no  rebound/guarding.   Neuro:  Normal reflexes, CN. Sensory/motor exams grossly normal deficit. Coordination  and gait normal.   MS: No joint effusions.  No peripheral edema.  Skin: Skin is warm and dry. No rashes, erythema, diaphoresis.   Psych:   Normal mood and affect. Behavior and judgment normal.      DISCHARGE MEDICATIONS      Discharge Medications          START taking these medications         Instructions Prescription details   acetaminophen 500 MG Tabs  Commonly known as: Tylenol Extra Strength       Take 1 tablet (500 mg total) by mouth every 4 (four) hours as needed for Fever (equal to or greater than 100.4).    Refills: 0                   CONSULTANTS  Consultants           Provider   Role Specialty      Jamari Garcia MD   Consulting Physician SURGERY, ORTHOPEDIC      Behery, Yovani Tate MD   Consulting Physician Surgery, Orthopaedic                FOLLOW UP:  Loy Metzger MD     Follow up in 1 week(s)  Post Discharge Followup     Jamari Garcia MD  [address]  [address]  [place] [id number] [phone]     Follow up in 3 week(s)  Post Discharge Followup -   Call [phone] for appointment.     The above plan and follow-up instructions were reviewed with the patient and they verbalized understanding and agreement.  They understand to return to the emergency room for any concerning signs or symptoms.  Greater than 30 minutes spent on discharge.  -----------------------     Hospital Discharge Diagnoses:  fall     Lace+ Score: 40  59-90 High Risk  29-58 Medium Risk  0-28   Low Risk.     TCM Follow-Up Recommendation:  LACE 29-58: Moderate Risk of readmission after discharge from the hospital.      Signed by Fausto Wright MD on 1/2/2024  5:30 PM             Vitals (last day) before discharge       Date/Time Temp Pulse Resp BP SpO2 Weight O2 Device O2 Flow Rate (L/min) [facility]    01/02/24 1226 -- 76 -- 142/73 100 % -- None (Room air) -- SK    01/02/24 1225 -- 73 -- 141/85 99 % -- None (Room air) -- SK     01/02/24 1224 -- 71 -- 142/69 99 % -- None (Room air) -- SK    01/02/24 0805 98.2 °F (36.8 °C) 70 16 134/65 96 % -- None (Room air) -- SK    01/02/24 0430 98.2 °F (36.8 °C) 67 16 124/74 97 % -- None (Room air) -- JESSICA    01/01/24 2100 98.5 °F (36.9 °C) 76 16 127/62 98 % -- None (Room air) -- JESSICA    01/01/24 1637 97.6 °F (36.4 °C) -- 18 126/57 98 % -- None (Room air) -- YG    01/01/24 0810 -- -- -- 120/70 -- -- -- -- YG    01/01/24 0809 -- -- -- 120/70 -- -- -- --     01/01/24 0808 98.4 °F (36.9 °C) -- 18 117/56 99 % -- None (Room air) -- YG    01/01/24 0523 98 °F (36.7 °C) 70 18 122/64 99 % -- None (Room air) -- IB    01/01/24 0443 -- -- -- -- -- 165 lb 4.8 oz -- -- AE        Medication Administration Report  for Gita Bustamante as of 12/24/23 through 01/02/24  Legend:       Medications 12/24 12/25 12/26 12/27 12/28 12/29 12/30 12/31 01/01 01/02   Completed Medications   magnesium oxide (Mag-Ox) tab 400 mg  Dose: 400 mg  Freq: Once Route: OR  Start: 01/01/24 0900 End: 01/01/24 0812            [id number]         magnesium oxide (Mag-Ox) tab 400 mg  Dose: 400 mg  Freq: Once Route: OR  Start: 12/29/23 1415 End: 12/29/23 1702         [id number]            ondansetron (Zofran) 4 MG/2ML injection 4 mg  Dose: 4 mg  Freq: Once Route: IV  Start: 12/28/23 0846 End: 12/28/23 0856        [id number]             potassium chloride (K-Dur) tab 40 mEq  Dose: 40 mEq  Freq: Once Route: OR  Start: 01/01/24 0900 End: 01/01/24 0812   Admin Instructions:   Do not crush            [id number]         Discontinued Medications   Medications 12/24 12/25 12/26 12/27 12/28 12/29 12/30 12/31 01/01 01/02   acetaminophen (Tylenol Extra Strength) tab 500 mg  Dose: 500 mg  Freq: Every 4 hours PRN Route: OR  PRN Reason: Fever  PRN Comment: equal to or greater than 100.4  Start: 12/28/23 1019 End: 01/02/24 1753   Admin Instructions:   do not initiate oral therapy until 6-8 hours after the last IV acetaminophen dose if IV acetaminophen was used previously          1757 [C]5             acetaminophen (Tylenol) tab 650 mg  Dose: 650 mg  Freq: Every 4 hours PRN Route: OR  PRN Reason: mild pain  Start: 12/28/23 1019 End: 01/02/24 1753   Admin Instructions:   Use PRN reason as a guide and follow range order policy        6     7     8      9      10      11     12      13     [id number]      [id number]        Or  HYDROcodone-acetaminophen (Norco) 5-325 MG per tab 1 tablet  Dose: 1 tablet  Freq: Every 4 hours PRN Route: OR  PRN Reason: moderate pain  Start: 12/28/23 1019 End: 01/02/24 1753   Admin Instructions:   Use PRN reason as a guide and follow range order policy        16     17     18      19      20      21     22      [id number]     24      25        Or  HYDROcodone-acetaminophen (Norco) 5-325 MG per tab 2 tablet  Dose: 2 tablet  Freq: Every 4 hours PRN Route: OR  PRN Reason: severe pain  Start: 12/28/23 1019 End: 01/02/24 1753   Admin Instructions:   Use PRN reason as a guide and follow range order policy        1333 [C]26     1841 [C]27     [id number]      103437      750398      732131     [id number]      33     34      35                                               lidocaine-menthol 4-1 % patch 1 patch  Dose: 1 patch  Freq: Daily Route: TD  Start: 12/30/23 1745 End: 01/02/24 1753   Order specific questions:             2042 [C]36      [id number]     0930 [C]38      0813 [C]39     099864      0806 [C]41     [id number] 4473 [C]43                                                                         ondansetron (Zofran) 4 MG/2ML injection 4 mg  Dose: 4 mg  Freq: Every 6 hours PRN Route: IV  PRN Reasons: Nausea,vomiting  Start: 12/28/23 1019 End: 01/02/24 1753   Admin Instructions:   Default antiemetic sequence (unless otherwise preferred by patient):  1. ondansetron (Zofran) 2. metoclopramide (Reglan) 3. prochlorperazine (Compazine). Wait 15 minutes before proceeding to next medication in sequence.  Follow therapeutic duplication policy.         [id number]                                       sodium chloride 0.9% infusion  Rate: 75 mL/hr Freq: Continuous Route: IV  Start: 12/29/23 1115 End: 01/02/24 1753         [id number]     095113      458641       835349      [id number]        tiZANidine (Zanaflex) partial tab 1 mg  Dose: 1 mg  Freq: Every 6 hours PRN Route: OR  PRN Reason: Muscle spasms  Start: 12/29/23 0921 End: 01/02/24 1753         [id number]     450805      961750     [id number]           Medications 12/24 12/25 12/26 12/27 12/28 12/29 12/30 12/31 01/01 01/02

## 2024-09-26 ENCOUNTER — HOSPITAL ENCOUNTER (OUTPATIENT)
Dept: BONE DENSITY | Facility: HOSPITAL | Age: 69
Discharge: HOME | End: 2024-09-26
Attending: FAMILY MEDICINE
Payer: COMMERCIAL

## 2024-09-26 DIAGNOSIS — Z13.820 SCREENING FOR OSTEOPOROSIS: ICD-10-CM

## 2024-09-26 PROCEDURE — 77080 DXA BONE DENSITY AXIAL: CPT | Performed by: FAMILY MEDICINE

## 2024-10-04 ENCOUNTER — HOSPITAL ENCOUNTER (OUTPATIENT)
Dept: MAMMOGRAPHY | Facility: HOSPITAL | Age: 69
Discharge: HOME | End: 2024-10-04
Attending: FAMILY MEDICINE
Payer: COMMERCIAL

## 2024-10-04 DIAGNOSIS — Z12.31 ENCOUNTER FOR SCREENING MAMMOGRAM FOR MALIGNANT NEOPLASM OF BREAST: ICD-10-CM

## 2024-10-04 PROCEDURE — 77063 BREAST TOMOSYNTHESIS BI: CPT | Performed by: FAMILY MEDICINE

## 2024-10-04 PROCEDURE — 77067 SCR MAMMO BI INCL CAD: CPT | Performed by: FAMILY MEDICINE

## 2025-05-03 ENCOUNTER — HOSPITAL ENCOUNTER (EMERGENCY)
Facility: HOSPITAL | Age: 70
Discharge: HOME | End: 2025-05-03
Attending: EMERGENCY MEDICINE
Payer: COMMERCIAL

## 2025-05-03 VITALS
DIASTOLIC BLOOD PRESSURE: 81 MMHG | HEART RATE: 75 BPM | TEMPERATURE: 98 F | HEIGHT: 66 IN | OXYGEN SATURATION: 99 % | WEIGHT: 165 LBS | BODY MASS INDEX: 26.52 KG/M2 | RESPIRATION RATE: 20 BRPM | SYSTOLIC BLOOD PRESSURE: 142 MMHG

## 2025-05-03 DIAGNOSIS — M25.522 CHRONIC ELBOW PAIN, LEFT: Primary | ICD-10-CM

## 2025-05-03 DIAGNOSIS — G89.29 CHRONIC ELBOW PAIN, LEFT: Primary | ICD-10-CM

## 2025-05-03 PROCEDURE — 99283 EMERGENCY DEPT VISIT LOW MDM: CPT

## 2025-05-03 PROCEDURE — 99284 EMERGENCY DEPT VISIT MOD MDM: CPT

## 2025-05-03 RX ORDER — NAPROXEN 500 MG/1
500 TABLET ORAL 2 TIMES DAILY PRN
Qty: 28 TABLET | Refills: 0 | Status: SHIPPED | OUTPATIENT
Start: 2025-05-03 | End: 2025-05-17

## 2025-05-03 RX ORDER — NAPROXEN 500 MG/1
500 TABLET ORAL ONCE
Status: COMPLETED | OUTPATIENT
Start: 2025-05-03 | End: 2025-05-03

## 2025-05-03 RX ORDER — ACETAMINOPHEN 500 MG
1000 TABLET ORAL ONCE
Status: COMPLETED | OUTPATIENT
Start: 2025-05-03 | End: 2025-05-03

## 2025-05-03 RX ORDER — ACETAMINOPHEN 500 MG
1000 TABLET ORAL EVERY 6 HOURS PRN
Qty: 56 TABLET | Refills: 0 | Status: SHIPPED | OUTPATIENT
Start: 2025-05-03 | End: 2025-05-17

## 2025-05-03 NOTE — ED PROVIDER NOTES
Patient Seen in: [facility] Emergency Department      History     Chief Complaint   Patient presents with    Elbow Pain     Stated Complaint: R elbow pain    Subjective:   HPI      History of Present Illness               Objective:     History reviewed. No pertinent past medical history.           Past Surgical History:   Procedure Laterality Date    Dean localization wire 1 site left (cpt=[id number])      2019    Needle biopsy right                  Social History     Socioeconomic History    Marital status:    Tobacco Use    Smoking status: Never    Smokeless tobacco: Never   Substance and Sexual Activity    Alcohol use: Never    Drug use: Never     Social Drivers of Health     Food Insecurity: No Food Insecurity (12/28/2023)    Food Insecurity     Food Insecurity: Never true   Transportation Needs: No Transportation Needs (12/28/2023)    Transportation Needs     Lack of Transportation: No   Housing Stability: Low Risk  (12/28/2023)    Housing Stability     Housing Instability: No                                Physical Exam     ED Triage Vitals [05/03/25 1222]   /81   Pulse 75   Resp 20   Temp 97.5 °F (36.4 °C)   Temp src Temporal   SpO2 99 %   O2 Device None (Room air)       Current Vitals:   Vital Signs  BP: 142/81  Pulse: 75  Resp: 20  Temp: 97.5 °F (36.4 °C)  Temp src: Temporal    Oxygen Therapy  SpO2: 99 %  O2 Device: None (Room air)        Physical Exam      Physical Exam                ED Course   Labs Reviewed - No data to display       Results                           MDM      69-year-old female without significant past medical history presents with left elbow pain.  Patient states that she had a fall downstairs in December 2023 with multiple injuries at that time.  At that time, she had injured her left elbow without fracture but has had some ongoing pain in the area since.  Lately, the pain has been increasing, particularly with extension.  Denies any reinjury, swelling,  numbness/weakness, or other new symptoms.    Exam, vitals normal, well-appearing, left elbow with near full range of motion with some tenderness to palpation.  Neurovascularly intact distally.  No palpable effusion.  No redness or warmth.    Differential: Acute on chronic pain in the left elbow likely a posttraumatic arthritis    Will start a trial of naproxen and Tylenol with instructions to follow-up with PMD as needed and return precautions.        Medical Decision Making      Disposition and Plan     Clinical Impression:  1. Chronic elbow pain, left         Disposition:  Discharge  5/3/2025  1:12 pm    Follow-up:  Loy Metzger MD    Follow up in 2 week(s)  As needed    We recommend that you schedule follow up care with a primary care provider within the next three months to obtain basic health screening including reassessment of your blood pressure.      Medications Prescribed:  Discharge Medication List as of 5/3/2025  1:29 PM        START taking these medications    Details   naproxen 500 MG Oral Tab Take 1 tablet (500 mg total) by mouth 2 (two) times daily as needed., Normal, Disp-28 tablet, R-0      !! acetaminophen 500 MG Oral Tab Take 2 tablets (1,000 mg total) by mouth every 6 (six) hours as needed for Pain., Normal, Disp-56 tablet, R-0       !! - Potential duplicate medications found. Please discuss with provider.          Supplementary Documentation:

## 2025-05-03 NOTE — ED INITIAL ASSESSMENT (HPI)
Pt to ED w/ c/o left elbow pain, pt states that she fell down the stairs in December 2023 and it has been hurting since. CMS intact. Radial pulse palpable bilaterally.

## (undated) NOTE — ED AVS SNAPSHOT
Mara Leiva   MRN: [id number]    Department:  [facility] Emergency Department   Date of Visit:  8/31/2018           Disclosure     Insurance plans vary and the physician(s) referred by the ER may not be covered by your plan.  Please contac CARE PHYSICIAN AT ONCE OR RETURN IMMEDIATELY TO THE EMERGENCY DEPARTMENT. If you have been prescribed any medication(s), please fill your prescription right away and begin taking the medication(s) as directed.   If you believe that any of the medications

## (undated) NOTE — LETTER
Hospital Discharge Documentation  Please phone to schedule a hospital follow up appointment.    From: [facility] Hospitalist's Office  Phone: [phone]    Patient discharged time/date: 2024  3:53 PM  Patient discharge disposition:  Home Health Care Services       Discharge Summary - D/C Summary        Discharge Summary signed by Fausto Wright MD at 2024  5:30 PM  Version 1 of 1      Author: Fausto Wright MD Service: Hospitalist Author Type: Physician    Filed: 2024  5:30 PM Date of Service: 2024  5:28 PM Status: Signed    : Fausto Wright MD (Physician)           [facility]     DISCHARGE SUMMARY     Gita Bustamante Patient Status:  Inpatient    10/29/1955 MRN [id number]   Location [facility] 5SW/SE Attending No att. providers found   Hosp Day # 5 PCP SUKHWINDER THOMPSON MD     DATE OF ADMISSION: 2023  DATE OF DISCHARGE: 2024   DISPOSITION: home  CONDITION ON DISCHARGE: good    DISCHARGE DIAGNOSES:  Some sungaleal hematoma  Mechanical fall  Orthostatic hypotension  Dizziness  Right hip pain  Right wrist contusion with possible distal radial impacted fracture    HISTORY OF PRESENT ILLNESS (COPIED FROM ADMISSION H&P)  Gita Bustamante is a 68 year old female with no significant PMHx presented to the ER with her  after a fall. The patient fell down 5 concrete steps after her grandchildren ran into her.   The patient did not lose consciousness and did not injury any of her bony prominences.   She has poor short term memory.    HOSPITAL COURSE:  Patient was admitted.  Imaging was extensive but only found possible distal radial impacted fracture.  Orthopedics was on consult and did not recommendSurgical intervention.  She was put in a immobilizer and will follow-up with orthopedics in about 2 weeks.  Pain was well-controlled.  Blood pressure is normalized.  Official recommendations for subacute rehab.  Patient and family politely declined this and will  be going home.  She has lots of help at home.    Patient understands return to the emergency room for increased pain, fever, discharge, shortness of breath, chest pain, new neurologic symptoms, other concerning symptoms.    PHYSICAL EXAM:  Temp:  [98.2 °F (36.8 °C)-98.5 °F (36.9 °C)] 98.2 °F (36.8 °C)  Pulse:  [67-76] 76  Resp:  [16] 16  BP: (124-142)/(62-85) 142/73  SpO2:  [96 %-100 %] 100 %  Gen: A+Ox3.  No distress.   HEENT: NCAT, neck supple, no carotid bruit.  CV: RRR, S1S2, and intact distal pulses. No gallop, rub, murmur.  Pulm: Effort and breath sounds normal. No distress, wheezes, rales, rhonchi.  Abd: Soft, NTND, BS normal, no mass, no HSM, no rebound/guarding.   Neuro: Normal reflexes, CN. Sensory/motor exams grossly normal deficit. Coordination  and gait normal.   MS: No joint effusions.  No peripheral edema.  Skin: Skin is warm and dry. No rashes, erythema, diaphoresis.   Psych: Normal mood and affect. Behavior and judgment normal.     DISCHARGE MEDICATIONS     Discharge Medications        START taking these medications        Instructions Prescription details   acetaminophen 500 MG Tabs  Commonly known as: Tylenol Extra Strength      Take 1 tablet (500 mg total) by mouth every 4 (four) hours as needed for Fever (equal to or greater than 100.4).   Refills: 0              CONSULTANTS  Consultants         Provider   Role Specialty     Jamari Garcia MD  Consulting Physician SURGERY, ORTHOPEDIC     BeheryYovani MD  Consulting Physician Surgery, Orthopaedic            FOLLOW UP:  Loy Metzger MD    Follow up in 1 week(s)  Post Discharge Followup    Jamari Garcia MD  [address]  [address]  [place] [id number] [phone]    Follow up in 3 week(s)  Post Discharge Followup -   Call [phone] for appointment.    The above plan and follow-up instructions were reviewed with the patient and they verbalized understanding and agreement.  They understand to return to the emergency room for any  concerning signs or symptoms.  Greater than 30 minutes spent on discharge.  -----------------------    Hospital Discharge Diagnoses:  fall    Lace+ Score: 40  59-90 High Risk  29-58 Medium Risk  0-28   Low Risk.    TCM Follow-Up Recommendation:  LACE 29-58: Moderate Risk of readmission after discharge from the hospital.    Electronically signed by Fausto Wright MD on 1/2/2024  5:30 PM